# Patient Record
Sex: FEMALE | Race: WHITE
[De-identification: names, ages, dates, MRNs, and addresses within clinical notes are randomized per-mention and may not be internally consistent; named-entity substitution may affect disease eponyms.]

---

## 2017-08-22 ENCOUNTER — HOSPITAL ENCOUNTER (INPATIENT)
Dept: HOSPITAL 17 - NEPE | Age: 67
LOS: 4 days | Discharge: HOME HEALTH | DRG: 683 | End: 2017-08-26
Attending: HOSPITALIST | Admitting: HOSPITALIST
Payer: MEDICARE

## 2017-08-22 VITALS — OXYGEN SATURATION: 99 %

## 2017-08-22 VITALS
OXYGEN SATURATION: 100 % | DIASTOLIC BLOOD PRESSURE: 71 MMHG | SYSTOLIC BLOOD PRESSURE: 128 MMHG | HEART RATE: 92 BPM | RESPIRATION RATE: 14 BRPM

## 2017-08-22 VITALS
RESPIRATION RATE: 16 BRPM | DIASTOLIC BLOOD PRESSURE: 72 MMHG | HEART RATE: 90 BPM | SYSTOLIC BLOOD PRESSURE: 128 MMHG | OXYGEN SATURATION: 100 % | TEMPERATURE: 98.1 F

## 2017-08-22 VITALS
HEART RATE: 95 BPM | TEMPERATURE: 97 F | SYSTOLIC BLOOD PRESSURE: 110 MMHG | DIASTOLIC BLOOD PRESSURE: 65 MMHG | OXYGEN SATURATION: 100 % | RESPIRATION RATE: 18 BRPM

## 2017-08-22 VITALS
OXYGEN SATURATION: 99 % | TEMPERATURE: 98.5 F | RESPIRATION RATE: 15 BRPM | SYSTOLIC BLOOD PRESSURE: 118 MMHG | HEART RATE: 107 BPM | DIASTOLIC BLOOD PRESSURE: 57 MMHG

## 2017-08-22 VITALS — WEIGHT: 136.91 LBS | HEIGHT: 64 IN | BODY MASS INDEX: 23.37 KG/M2

## 2017-08-22 DIAGNOSIS — G25.81: ICD-10-CM

## 2017-08-22 DIAGNOSIS — N17.9: Primary | ICD-10-CM

## 2017-08-22 DIAGNOSIS — M81.0: ICD-10-CM

## 2017-08-22 DIAGNOSIS — I10: ICD-10-CM

## 2017-08-22 DIAGNOSIS — D63.8: ICD-10-CM

## 2017-08-22 DIAGNOSIS — E87.6: ICD-10-CM

## 2017-08-22 DIAGNOSIS — F41.9: ICD-10-CM

## 2017-08-22 DIAGNOSIS — E86.0: ICD-10-CM

## 2017-08-22 DIAGNOSIS — D50.9: ICD-10-CM

## 2017-08-22 DIAGNOSIS — K22.70: ICD-10-CM

## 2017-08-22 DIAGNOSIS — F32.9: ICD-10-CM

## 2017-08-22 DIAGNOSIS — E83.51: ICD-10-CM

## 2017-08-22 DIAGNOSIS — R62.7: ICD-10-CM

## 2017-08-22 DIAGNOSIS — E87.1: ICD-10-CM

## 2017-08-22 LAB
ALP SERPL-CCNC: 262 U/L (ref 45–117)
ALT SERPL-CCNC: 30 U/L (ref 10–53)
ANION GAP SERPL CALC-SCNC: 13 MEQ/L (ref 5–15)
AST SERPL-CCNC: 156 U/L (ref 15–37)
BACTERIA #/AREA URNS HPF: (no result) /HPF
BASOPHILS # BLD AUTO: 0.1 TH/MM3 (ref 0–0.2)
BASOPHILS NFR BLD: 1.2 % (ref 0–2)
BILIRUB SERPL-MCNC: 1.8 MG/DL (ref 0.2–1)
BUN SERPL-MCNC: 29 MG/DL (ref 7–18)
CHLORIDE SERPL-SCNC: 90 MEQ/L (ref 98–107)
COLOR UR: YELLOW
COMMENT (UR): (no result)
CULTURE IF INDICATED: (no result)
EOSINOPHIL # BLD: 0 TH/MM3 (ref 0–0.4)
EOSINOPHIL NFR BLD: 0.5 % (ref 0–4)
ERYTHROCYTE [DISTWIDTH] IN BLOOD BY AUTOMATED COUNT: 16.5 % (ref 11.6–17.2)
GFR SERPLBLD BASED ON 1.73 SQ M-ARVRAT: 18 ML/MIN (ref 89–?)
GLUCOSE UR STRIP-MCNC: (no result) MG/DL
HCO3 BLD-SCNC: 24.7 MEQ/L (ref 21–32)
HCT VFR BLD CALC: 24.2 % (ref 35–46)
HEMO FLAGS: (no result)
HGB UR QL STRIP: (no result)
INR PPP: 1.1 RATIO
KETONES UR STRIP-MCNC: (no result) MG/DL
LYMPHOCYTES # BLD AUTO: 0.4 TH/MM3 (ref 1–4.8)
LYMPHOCYTES NFR BLD AUTO: 6.6 % (ref 9–44)
MCH RBC QN AUTO: 38.1 PG (ref 27–34)
MCHC RBC AUTO-ENTMCNC: 35 % (ref 32–36)
MCV RBC AUTO: 108.9 FL (ref 80–100)
MONOCYTES NFR BLD: 14.1 % (ref 0–8)
NEUTROPHILS # BLD AUTO: 5 TH/MM3 (ref 1.8–7.7)
NEUTROPHILS NFR BLD AUTO: 77.6 % (ref 16–70)
NITRITE UR QL STRIP: (no result)
PLATELET # BLD: 260 TH/MM3 (ref 150–450)
POTASSIUM SERPL-SCNC: 3.9 MEQ/L (ref 3.5–5.1)
PROTHROMBIN TIME: 12.7 SEC (ref 9.8–11.6)
RBC # BLD AUTO: 2.23 MIL/MM3 (ref 4–5.3)
SODIUM SERPL-SCNC: 128 MEQ/L (ref 136–145)
SP GR UR STRIP: 1.01 (ref 1–1.03)
SQUAMOUS #/AREA URNS HPF: 1 /HPF (ref 0–5)
WBC # BLD AUTO: 6.5 TH/MM3 (ref 4–11)

## 2017-08-22 PROCEDURE — 83550 IRON BINDING TEST: CPT

## 2017-08-22 PROCEDURE — 82728 ASSAY OF FERRITIN: CPT

## 2017-08-22 PROCEDURE — 82248 BILIRUBIN DIRECT: CPT

## 2017-08-22 PROCEDURE — 76937 US GUIDE VASCULAR ACCESS: CPT

## 2017-08-22 PROCEDURE — 83690 ASSAY OF LIPASE: CPT

## 2017-08-22 PROCEDURE — 85610 PROTHROMBIN TIME: CPT

## 2017-08-22 PROCEDURE — 82272 OCCULT BLD FECES 1-3 TESTS: CPT

## 2017-08-22 PROCEDURE — 76700 US EXAM ABDOM COMPLETE: CPT

## 2017-08-22 PROCEDURE — 96360 HYDRATION IV INFUSION INIT: CPT

## 2017-08-22 PROCEDURE — 80074 ACUTE HEPATITIS PANEL: CPT

## 2017-08-22 PROCEDURE — 83540 ASSAY OF IRON: CPT

## 2017-08-22 PROCEDURE — 85007 BL SMEAR W/DIFF WBC COUNT: CPT

## 2017-08-22 PROCEDURE — 80048 BASIC METABOLIC PNL TOTAL CA: CPT

## 2017-08-22 PROCEDURE — 85025 COMPLETE CBC W/AUTO DIFF WBC: CPT

## 2017-08-22 PROCEDURE — 93306 TTE W/DOPPLER COMPLETE: CPT

## 2017-08-22 PROCEDURE — 80053 COMPREHEN METABOLIC PANEL: CPT

## 2017-08-22 PROCEDURE — 74176 CT ABD & PELVIS W/O CONTRAST: CPT

## 2017-08-22 PROCEDURE — 82607 VITAMIN B-12: CPT

## 2017-08-22 PROCEDURE — 81001 URINALYSIS AUTO W/SCOPE: CPT

## 2017-08-22 PROCEDURE — 84155 ASSAY OF PROTEIN SERUM: CPT

## 2017-08-22 PROCEDURE — 84443 ASSAY THYROID STIM HORMONE: CPT

## 2017-08-22 PROCEDURE — 85027 COMPLETE CBC AUTOMATED: CPT

## 2017-08-22 PROCEDURE — 93880 EXTRACRANIAL BILAT STUDY: CPT

## 2017-08-22 PROCEDURE — 80076 HEPATIC FUNCTION PANEL: CPT

## 2017-08-22 PROCEDURE — 82746 ASSAY OF FOLIC ACID SERUM: CPT

## 2017-08-22 RX ADMIN — Medication SCH ML: at 22:06

## 2017-08-22 NOTE — RADRPT
EXAM DATE/TIME:  08/22/2017 19:35 

 

[facility] COMPARISON:     

No previous studies available for comparison.

 

 

INDICATIONS :     

Abdominal pain and general weakness.

                  

 

ORAL CONTRAST:      

No oral contrast ingested.

                  

 

RADIATION DOSE:     

5.63 CTDIvol (mGy) 

 

 

MEDICAL HISTORY :     

None  

 

SURGICAL HISTORY :      

Hysterectomy. 

 

ENCOUNTER:      

Initial

 

ACUITY:      

1 day

 

PAIN SCALE:      

4/10

 

LOCATION:         

abdomen

 

TECHNIQUE:     

Volumetric scanning of the abdomen and pelvis was performed.  Using automated exposure control and ad
justment of the mA and/or kV according to patient size, radiation dose was kept as low as reasonably 
achievable to obtain optimal diagnostic quality images.  DICOM format image data is available electro
nically for review and comparison.  

 

FINDINGS:     

 

LOWER LUNGS:     

There is a 4 mm noncalcified pulmonary nodule in the right lower lobe and a 5 mm noncalcified pulmona
ry nodule in the left lower lobe.

 

LIVER:     

Severe diffuse low density without lesion.  There is no dilation of the biliary tree.  No calcified g
allstones.

 

SPLEEN:     

Normal size without lesion.

 

PANCREAS:     

Within normal limits. 

 

KIDNEYS:     

Normal in size and shape.  There is no mass, stone, or hydronephrosis.

 

ADRENAL GLANDS:     

Within normal limits.

 

VASCULAR:     

There is no aortic aneurysm. There is moderate atherosclerotic disease.

 

BOWEL/MESENTERY:     

The stomach, small bowel, and colon demonstrate no acute abnormality.  There is no free intraperitone
al air or fluid. There is sigmoid diverticulosis.

 

ABDOMINAL WALL:     

Within normal limits.

 

RETROPERITONEUM:     

There is no lymphadenopathy.

 

BLADDER:     

No wall thickening or mass.

 

REPRODUCTIVE:     

The uterus is absent. No adnexal abnormality is seen.

 

INGUINAL:     

There is no lymphadenopathy or hernia.

 

MUSCULOSKELETAL:     

There is a right hip arthroplasty hardware present causing beam hardening artifact and partial obscur
ation of the pelvic structures. Degenerative changes are present at L4-L5 and L5-S1.

 

CONCLUSION:     

1. No acute finding is identified to explain the clinical symptoms.

2. There is a 4 mm right lower lobe and 5 mm left lower lobe on pulmonary nodule. Suggest correlating
 with any prior imaging studies that could confirm longer-term stability. If none are available, sugg
est 6 month followup noncontrast chest CT.

3. Nonacute findings include severe hepatic steatosis, moderate atherosclerotic disease, and sigmoid 
diverticulosis.

 

 

 

 Dusty Forbes MD on August 22, 2017 at 20:19           

Board Certified Radiologist.

 This report was verified electronically.

## 2017-08-23 VITALS
DIASTOLIC BLOOD PRESSURE: 76 MMHG | OXYGEN SATURATION: 97 % | TEMPERATURE: 97.6 F | RESPIRATION RATE: 17 BRPM | SYSTOLIC BLOOD PRESSURE: 127 MMHG | HEART RATE: 76 BPM

## 2017-08-23 VITALS
HEART RATE: 80 BPM | OXYGEN SATURATION: 94 % | RESPIRATION RATE: 17 BRPM | DIASTOLIC BLOOD PRESSURE: 70 MMHG | SYSTOLIC BLOOD PRESSURE: 168 MMHG | TEMPERATURE: 96.6 F

## 2017-08-23 VITALS
SYSTOLIC BLOOD PRESSURE: 123 MMHG | HEART RATE: 104 BPM | OXYGEN SATURATION: 97 % | RESPIRATION RATE: 17 BRPM | TEMPERATURE: 96.6 F | DIASTOLIC BLOOD PRESSURE: 72 MMHG

## 2017-08-23 VITALS
TEMPERATURE: 98.2 F | RESPIRATION RATE: 17 BRPM | SYSTOLIC BLOOD PRESSURE: 138 MMHG | DIASTOLIC BLOOD PRESSURE: 65 MMHG | OXYGEN SATURATION: 99 % | HEART RATE: 108 BPM

## 2017-08-23 VITALS
DIASTOLIC BLOOD PRESSURE: 72 MMHG | OXYGEN SATURATION: 100 % | SYSTOLIC BLOOD PRESSURE: 122 MMHG | HEART RATE: 101 BPM | RESPIRATION RATE: 17 BRPM | TEMPERATURE: 95.9 F

## 2017-08-23 LAB
ALP SERPL-CCNC: 237 U/L (ref 45–117)
ALT SERPL-CCNC: 30 U/L (ref 10–53)
ANION GAP SERPL CALC-SCNC: 9 MEQ/L (ref 5–15)
AST SERPL-CCNC: 150 U/L (ref 15–37)
BASOPHILS # BLD AUTO: 0 TH/MM3 (ref 0–0.2)
BASOPHILS NFR BLD: 0.9 % (ref 0–2)
BILIRUB SERPL-MCNC: 1.3 MG/DL (ref 0.2–1)
BUN SERPL-MCNC: 31 MG/DL (ref 7–18)
CHLORIDE SERPL-SCNC: 97 MEQ/L (ref 98–107)
EOSINOPHIL # BLD: 0.1 TH/MM3 (ref 0–0.4)
EOSINOPHIL NFR BLD: 1.4 % (ref 0–4)
ERYTHROCYTE [DISTWIDTH] IN BLOOD BY AUTOMATED COUNT: 16.2 % (ref 11.6–17.2)
GFR SERPLBLD BASED ON 1.73 SQ M-ARVRAT: 18 ML/MIN (ref 89–?)
HCO3 BLD-SCNC: 25.7 MEQ/L (ref 21–32)
HCT VFR BLD CALC: 22.4 % (ref 35–46)
HEMO FLAGS: (no result)
LYMPHOCYTES # BLD AUTO: 0.7 TH/MM3 (ref 1–4.8)
LYMPHOCYTES NFR BLD AUTO: 12.7 % (ref 9–44)
MCH RBC QN AUTO: 36.8 PG (ref 27–34)
MCHC RBC AUTO-ENTMCNC: 33.4 % (ref 32–36)
MCV RBC AUTO: 110.1 FL (ref 80–100)
MONOCYTES NFR BLD: 15.3 % (ref 0–8)
NEUTROPHILS # BLD AUTO: 3.6 TH/MM3 (ref 1.8–7.7)
NEUTROPHILS NFR BLD AUTO: 69.7 % (ref 16–70)
PLATELET # BLD: 240 TH/MM3 (ref 150–450)
POTASSIUM SERPL-SCNC: 3.7 MEQ/L (ref 3.5–5.1)
RBC # BLD AUTO: 2.03 MIL/MM3 (ref 4–5.3)
SODIUM SERPL-SCNC: 132 MEQ/L (ref 136–145)
VIT B12 SERPL-MCNC: 1041 PG/ML (ref 193–986)
WBC # BLD AUTO: 5.2 TH/MM3 (ref 4–11)

## 2017-08-23 RX ADMIN — Medication SCH ML: at 22:49

## 2017-08-23 RX ADMIN — PHENYTOIN SODIUM SCH MLS/HR: 50 INJECTION INTRAMUSCULAR; INTRAVENOUS at 22:50

## 2017-08-23 RX ADMIN — Medication SCH ML: at 09:00

## 2017-08-23 RX ADMIN — DIAZEPAM PRN MG: 5 TABLET ORAL at 20:33

## 2017-08-23 RX ADMIN — PANTOPRAZOLE SCH MG: 40 TABLET, DELAYED RELEASE ORAL at 09:30

## 2017-08-23 RX ADMIN — PHENYTOIN SODIUM SCH MLS/HR: 50 INJECTION INTRAMUSCULAR; INTRAVENOUS at 13:55

## 2017-08-23 RX ADMIN — DIAZEPAM PRN MG: 5 TABLET ORAL at 09:31

## 2017-08-23 RX ADMIN — HEPARIN SODIUM SCH UNITS: 10000 INJECTION, SOLUTION INTRAVENOUS; SUBCUTANEOUS at 20:31

## 2017-08-23 RX ADMIN — PHENYTOIN SODIUM SCH MLS/HR: 50 INJECTION INTRAMUSCULAR; INTRAVENOUS at 02:57

## 2017-08-23 NOTE — HHI.HP
__________________________________________________





HPI


Service


[facility]


Primary Care Physician


Charlene Jimenez, DO


Admission Diagnosis





acute renal failure, failure to thrive, dehydration


Diagnoses:  


Travel History


International Travel<30 Days:  No


Contact w/Intl Traveler <30 Da:  No


Traveled to Known Affected Are:  No


History of Present Illness


when i got up, i could not stand up, dizzy, heart beating fast , 


no sweating 


no nausea at thsi time, but have been a few days time 


no room spinning 


no vomiting 


no syncope, no falls, not hitting head


but felt so weak, could not even walk to car, had to call 911





not this bad, but has had other similar eposides


went to pcp previously, and was told to go to er if it hapens again





but had nausea and diarrhea- but has had it on and off for a while- about 2 

weeks


diarrhea is more of one large amount, then a few small amounts


no black or red color 


no vomiting 


no fever 


had cough a few days - white color mucus, no more cough now


no abdominal pain or chest pain or shortness of breath


has hx of vertigo


no urine symptoms





egd - a month ago- found two little ulcers there, but was not actively bleeding

; but had beginning stages of barretts . Dr Morel


last clonoscopy about 5yrs ago, was told not due for one now





Past Family Social History


Past Medical History


htn


panic attacks


RLS


Past Surgical History


breast cancer left lumpectomy


just had one dose of chemo- but refused further


s/p radiation 10yrs ago 


had mammogram yearly





right hip orif


right hand sx 


hysterectomy


Allergies:  


Uncoded Allergies:  


     raw egg (Allergy, Unknown, 17)


Family History


parents- father- htn


mother- htn


Social History


never a smoker


drink etoh about 2 glasses of wine 


no drugs 


no longer driving due to vertigo





Physical Exam


Vital Signs





Vital Signs








  Date Time  Temp Pulse Resp B/P (MAP) Pulse Ox O2 Delivery O2 Flow Rate FiO2


 


17 21:12      Room Air  


 


17 20:13 98.1 90 16 128/72 (90) 100 Room Air  


 


17 19:08  92 14 128/71 (90) 100 Room Air  


 


17 17:23     99 Room Air  


 


17 16:13 98.5 107 15 118/57 (77) 99 Room Air  








Physical Exam


GENERAL: This is a well-nourished, well-developed patient, in no apparent 

distress.


SKIN: No rashes, ecchymoses or lesions. Cool and dry.


HEAD: Atraumatic. Normocephalic. No temporal or scalp tenderness.


EYES: Pupils equal round and reactive. Extraocular motions intact. No scleral 

icterus. No injection or drainage. 


ENT: Nose without bleeding, purulent drainage or septal hematoma. Throat 

without erythema, tonsillar hypertrophy or exudate. Uvula midline. Airway 

patent.


NECK: Trachea midline. No JVD or lymphadenopathy. Supple, nontender, no 

meningeal signs.


CARDIOVASCULAR: Regular rate and rhythm without murmurs, gallops, or rubs. 


RESPIRATORY: Clear to auscultation. Breath sounds equal bilaterally. No wheezes

, rales, or rhonchi.  


GASTROINTESTINAL: Abdomen soft, non-tender, nondistended. No hepato-splenomegaly

, or palpable masses. No guarding.


MUSCULOSKELETAL: Extremities without clubbing, cyanosis, or edema. No joint 

tenderness, effusion, or edema noted. No calf tenderness. Negative Homans sign 

bilaterally.


NEUROLOGICAL: Awake and alert. Cranial nerves II through XII intact.  Motor and 

sensory grossly within normal limits. Five out of 5 muscle strength in all 

muscle groups.  Normal speech.


Laboratory





Laboratory Tests








Test


  17


17:00 17


23:12


 


White Blood Count 6.5  


 


Red Blood Count 2.23  


 


Hemoglobin 8.5  


 


Hematocrit 24.2  


 


Mean Corpuscular Volume 108.9  


 


Mean Corpuscular Hemoglobin 38.1  


 


Mean Corpuscular Hemoglobin


Concent 35.0 


  


 


 


Red Cell Distribution Width 16.5  


 


Platelet Count 260  


 


Mean Platelet Volume 9.0  


 


Neutrophils (%) (Auto) 77.6  


 


Lymphocytes (%) (Auto) 6.6  


 


Monocytes (%) (Auto) 14.1  


 


Eosinophils (%) (Auto) 0.5  


 


Basophils (%) (Auto) 1.2  


 


Neutrophils # (Auto) 5.0  


 


Lymphocytes # (Auto) 0.4  


 


Monocytes # (Auto) 0.9  


 


Eosinophils # (Auto) 0.0  


 


Basophils # (Auto) 0.1  


 


CBC Comment DIFF FINAL  


 


Differential Comment   


 


Prothrombin Time 12.7  


 


Prothromb Time International


Ratio 1.1 


  


 


 


Blood Urea Nitrogen 29  


 


Creatinine 2.64  


 


Random Glucose 89  


 


Total Protein 5.9  


 


Albumin 2.7  


 


Calcium Level 7.9  


 


Alkaline Phosphatase 262  


 


Aspartate Amino Transf


(AST/SGOT) 156 


  


 


 


Alanine Aminotransferase


(ALT/SGPT) 30 


  


 


 


Total Bilirubin 1.8  


 


Direct Bilirubin 0.8  


 


Sodium Level 128  


 


Potassium Level 3.9  


 


Chloride Level 90  


 


Carbon Dioxide Level 24.7  


 


Anion Gap 13  


 


Estimat Glomerular Filtration


Rate 18 


  


 


 


Lipase 124  


 


Urine Color  YELLOW 


 


Urine Turbidity  CLEAR 


 


Urine pH  5.5 


 


Urine Specific Gravity  1.006 


 


Urine Protein  NEG 


 


Urine Glucose (UA)  NEG 


 


Urine Ketones  NEG 


 


Urine Occult Blood  NEG 


 


Urine Nitrite  NEG 


 


Urine Bilirubin  NEG 


 


Urine Urobilinogen  LESS THAN 2.0 


 


Urine Leukocyte Esterase  NEG 


 


Urine RBC  LESS THAN 1 


 


Urine WBC  2 


 


Urine Squamous Epithelial


Cells 


  1 


 


 


Urine Amorphous Sediment  RARE 


 


Urine Bacteria  RARE 


 


Microscopic Urinalysis Comment


  


  CULT NOT


INDICATED








Result Diagram:  


17








Caprini VTE Risk Assessment


Caprini Risk Assessment Model











 Point Value = 1          Point Value = 2  Point Value = 3  Point Value = 5


 


Age 41-60


Minor surgery


BMI > 25 kg/m2


Swollen legs


Varicose veins


Pregnancy or postpartum


History of unexplained or recurrent


   spontaneous 


Oral contraceptives or hormone


   replacement


Sepsis (< 1 month)


Serious lung disease, including


   pneumonia (< 1 month)


Abnormal pulmonary function


Acute myocardial infarction


Congestive heart failure (< 1 month)


History of inflammatory bowel disease


Medical patient at bed rest Age 61-74


Arthroscopic surgery


Major open surgery (> 45 min)


Laparoscopic surgery (> 45 min)


Malignancy


Confined to bed (> 72 hours)


Immobilizing plaster cast


Central venous access Age >= 75


History of VTE


Family history of VTE


Factor V Leiden


Prothrombin [id number]


Lupus anticoagulant


Anticardiolipin antibodies


Elevated serum homocysteine


Heparin-induced thrombocytopenia


Other congenital or acquired


   thrombophilia Stroke (< 1 month)


Elective arthroplasty


Hip, pelvis, or leg fracture


Acute spinal cord injury (< 1 month)








Prophylaxis Regimen











   Total Risk


Factor Score Risk Level Prophylaxis Regimen


 


0-1      Low Early ambulation


 


2 Moderate Order ONE of the following:


*Sequential Compression Device (SCD)


*Heparin 5000 units SQ BID


 


3-4 Higher Order ONE of the following medications:


*Heparin 5000 units SQ TID


*Enoxaparin/Lovenox 40 mg SQ daily (WT < 150 kg, CrCl > 30 mL/min)


*Enoxaparin/Lovenox 30 mg SQ daily (WT < 150 kg, CrCl > 10-29 mL/min)


*Enoxaparin/Lovenox 30 mg SQ BID (WT < 150 kg, CrCl > 30 mL/min)


AND/OR


*Sequential Compression Device (SCD)


 


5 or more Highest Order ONE of the following medications:


*Heparin 5000 units SQ TID (Preferred with Epidurals)


*Enoxaparin/Lovenox 40 mg SQ daily (WT < 150 kg, CrCl > 30 mL/min)


*Enoxaparin/Lovenox 30 mg SQ daily (WT < 150 kg, CrCl > 10-29 mL/min)


*Enoxaparin/Lovenox 30 mg SQ BID (WT < 150 kg, CrCl > 30 mL/min)


AND


*Sequential Compression Device (SCD)











Assessment and Plan


Assessment and Plan


near syncope- in setting of chronic vertigo. Question whether orthostatics as 

well


acute renal failure


acute liver failure - underlying cirrhosis vs acute hepatitis 


anemia- acute on chronic - recent egd with early barretts 


macrocytosis- etoh induced? - check tsh, b12, folate


hyponatremia- likely poor oral intake








iv hydration 


repeat labs in am 


tsh, folate, hepatitis profile


echo, carotid


abdominal US -to evaluate for liver and kidney





Physician Certification


Order for Inpatient Services


The services are ordered in accordance with Medicare regulations or non-

Medicare payer requirements, as applicable.  In the case of services not 

specified as inpatient-only, they are appropriately provided as inpatient 

services in accordance with the 2-midnight benchmark.


 days is the estimated time the patient will need to remain in the hospital, 

assuming treatment plan goals are met and no additional complications.











Jada Dowling MD Aug 23, 2017 01:55

## 2017-08-23 NOTE — HHI.PR
Subjective


Remarks


Follow-up for dizziness/lightheadedness





No further episodes of presyncope or lightheadedness.  Has been walking to the 

bathroom.  He feels a lot better.  Denies any chest pain, no urinary symptoms.  

History about diarrhea, allegedly not loose but not her normal bowel movements.





Objective


Vitals





Vital Signs








  Date Time  Temp Pulse Resp B/P (MAP) Pulse Ox O2 Delivery O2 Flow Rate FiO2


 


8/23/17 12:00 95.9 101 17 122/72 (89) 100   





    134/81 (98)    





    125/67 (86)    


 


8/23/17 08:00 97.6 76 17 127/76 (93) 97   


 


8/23/17 05:05 98.2 108 17 138/65 (89) 99   


 


8/22/17 21:32 97.0 95 18 110/65 (80) 100   


 


8/22/17 21:12      Room Air  


 


8/22/17 20:13 98.1 90 16 128/72 (90) 100 Room Air  


 


8/22/17 19:08  92 14 128/71 (90) 100 Room Air  


 


8/22/17 17:23     99 Room Air  


 


8/22/17 16:13 98.5 107 15 118/57 (77) 99 Room Air  














I/O      


 


 8/22/17 8/22/17 8/22/17 8/23/17 8/23/17 8/23/17





 07:00 15:00 23:00 07:00 15:00 23:00


 


Intake Total   1120 ml 680 ml  


 


Output Total    1 ml  


 


Balance   1120 ml 679 ml  


 


      


 


Intake Oral   120 ml 480 ml  


 


IV Total   1000 ml 200 ml  


 


Output Urine Total    1 ml  


 


# Voids   1   


 


# Bowel Movements   0 0  








Result Diagram:  


8/23/17 0715                                                                   

             8/23/17 0715





Objective Remarks


Not in distress


PERRL,  pink conjunctiva without injection, anicteric


Nose without bleeding, airway patent, oropharynx clear


Supple neck, no masses or thyromegaly, trachea midline


Regular rate and rhythm, no murmurs


Clear to auscultation and symmetric bilaterally, normal respiratory effort.  


Normal bowel sounds, soft, non-tender, nondistended, no guarding. 


Extremities without clubbing, cyanosis, or edema. 


No rash of generalized distribution.  Skin is warm and dry.


AAO x3, no cranial nerve deficits, moves all 4 extremities, no focal neurologic 

deficits





A/P


Problem List:  


(1) Generalized weakness


ICD Code:  R53.1 - Weakness


Status:  Acute


(2) Acute renal failure


ICD Code:  N17.9 - Acute kidney failure, unspecified


Status:  Acute


(3) Dehydration


ICD Code:  E86.0 - Dehydration


Status:  Acute


Assessment and Plan


This is a 67-year-old female admitted for presyncope





Presyncope-in setting of chronic vertigo, orthostatics negative.  Likely 

secondary to hypovolemia, increase IVF.   Carotid ultrasound showed moderate 

stenosis 50-69% on the right, minimal stenosis of the left.  Follow-up 

echocardiogram.





Acute renal failure-increase normal saline 225 cc/h, check BMP tomorrow.  No 

hydronephrosis on CT scan of the abdomen.





Anemia-?  Etiology, check iron panel, previous history of Johnson's esophagus.  

Patient has macrocytosis, vitamin B 12 and folic acid good.  TSH within normal 

limits.





Mild AST ALT elevation- ultrasound of the liver showed hepato-steatosis, doubt 

cirrhosis, check hepatitis serology.





Hyponatremia-likely from hypovolemia, recheck BMP tomorrow, continue normal 

saline.





PT evaluation





Heparin for DVT prophylaxis





Discussed with wife and .


Discharge Planning


Possible discharge tomorrow





Problem Qualifiers





(1) Acute renal failure:  


Qualified Codes:  N17.9 - Acute kidney failure, unspecified








Chaparrita Griffin MD Aug 23, 2017 13:53

## 2017-08-23 NOTE — RADRPT
EXAM DATE/TIME:  08/23/2017 08:03 

 

[facility] COMPARISON:     

CT ABDOMEN & PELVIS W/O CONTRAST, August 22, 2017, 19:35.

        

 

 

INDICATIONS :     

Liver and kidney failure.

                     

 

MEDICAL HISTORY :           

Liver and kidney failure.

 

SURGICAL HISTORY :          

Tonsillectomy.  Right thumb surgery.  Hysterectomy.  Left breast lumpectomy.

 

ENCOUNTER:     

Initial

 

ACUITY:     

1 day

 

PAIN SCORE:     

0/10

 

LOCATION:     

Bilateral upper quadrant 

                     

MEASUREMENTS:     

 

LIVER:     

17.5 cm length 

 

COMMON DUCT:     

5 mm

 

RIGHT KIDNEY:     

10.9 x 4.7 x 5.2 cm

 

LEFT KIDNEY:     

10.8 x 4.5 x 5.6 cm

 

SPLEEN:     

9.4 cm length

 

AORTA:     

2.0cm maximal      

 

FINDINGS:     

 

LIVER:     

The liver is diffusely echogenic. No mass or ductal dilatation. Hepatopedal flow within the portal ve
in.

 

COMMON DUCT:     

No intraluminal mass or stone visualized.  

 

GALLBLADDER:     

Contains no stones, demonstrates no wall thickening or pericholecystic fluid.  

 

PANCREAS:     

The visualized portions are within normal limits.  

 

RIGHT KIDNEY:     

No hydronephrosis, stone or mass.  

 

LEFT KIDNEY:     

No hydronephrosis, stone or mass.  

 

SPLEEN:     

No focal lesion.  

 

AORTA:     

Non aneurysmal.  

 

IVC:     

Within normal limits.  

 

CONCLUSION:     

1. Hepatic steatosis.

 

 

 

 Francisco Waddell Jr., MD on August 23, 2017 at 10:23           

Board Certified Radiologist.

 This report was verified electronically.

## 2017-08-23 NOTE — RADRPT
EXAM DATE/TIME:  08/23/2017 08:03 

 

[facility] COMPARISON:     

No previous studies available for comparison.

        

 

 

INDICATIONS :     

General weakness, near syncope.

                     

 

MEDICAL HISTORY :           

General weakness, near syncope.

 

SURGICAL HISTORY :          

Tonsillectomy.  Right thumb surgery.  Hysterectomy.  Left breast lumpectomy.

 

ENCOUNTER:     

Initial

 

ACUITY:     

1 week

 

PAIN SCORE:     

0/10

 

LOCATION:     

Bilateral neck 

                     

PEAK SYSTOLIC VELOCITIES (cm/sec):

 

ICA/CCA RATIO:                    

Right: 1.7     Left: 0.98

 

ICA:                          

Right: 130     Left: 77

 

CCA:                          

Right: 75     Left: 78

 

ECA:                           

Right: 54     Left: 55

 

VERTEBRAL:           

Right: 45 antegrade     Left: 58 antegrade

             

Elevated flow velocities and ICA/CCA ratios have been found to correlate with increased degrees of

vessel stenosis, calculated as percentage of diameter relative to a normal segment of distal ICA/CCA

 

FINDINGS:     

 

RIGHT CAROTID:     

Mild plaque with increased ICA velocities corresponding to moderate stenosis.

 

LEFT CAROTID:     

Mild plaque. No significant stenosis is visualized.  The waveforms are within normal limits.

 

VERTEBRAL ARTERIES:  

Antegrade flow is seen in both vertebral arteries.

MISCELLANEOUS:     None.

 

CONCLUSION:     

1. Mild right carotid plaque with velocities corresponding to moderate, 50-69%, stenosis. Suspect champ
t this is artifactually elevated with overall imaging features most consistent with mild, less than 5
0%, stenosis.

2. No hemodynamically significant left carotid stenosis.

3. Antegrade vertebral artery flow bilaterally. 

 

 

 Hayder Driver MD on August 23, 2017 at 9:43           

Board Certified Radiologist.

 This report was verified electronically.

## 2017-08-24 VITALS
DIASTOLIC BLOOD PRESSURE: 82 MMHG | TEMPERATURE: 95.8 F | SYSTOLIC BLOOD PRESSURE: 148 MMHG | RESPIRATION RATE: 16 BRPM | OXYGEN SATURATION: 100 % | HEART RATE: 98 BPM

## 2017-08-24 VITALS
SYSTOLIC BLOOD PRESSURE: 151 MMHG | RESPIRATION RATE: 18 BRPM | TEMPERATURE: 98.6 F | HEART RATE: 116 BPM | OXYGEN SATURATION: 100 % | DIASTOLIC BLOOD PRESSURE: 91 MMHG

## 2017-08-24 VITALS
OXYGEN SATURATION: 98 % | DIASTOLIC BLOOD PRESSURE: 88 MMHG | TEMPERATURE: 97.5 F | SYSTOLIC BLOOD PRESSURE: 151 MMHG | HEART RATE: 98 BPM | RESPIRATION RATE: 17 BRPM

## 2017-08-24 VITALS
RESPIRATION RATE: 16 BRPM | OXYGEN SATURATION: 97 % | SYSTOLIC BLOOD PRESSURE: 130 MMHG | DIASTOLIC BLOOD PRESSURE: 84 MMHG | HEART RATE: 100 BPM | TEMPERATURE: 97.1 F

## 2017-08-24 VITALS
SYSTOLIC BLOOD PRESSURE: 139 MMHG | RESPIRATION RATE: 16 BRPM | TEMPERATURE: 95.6 F | HEART RATE: 114 BPM | DIASTOLIC BLOOD PRESSURE: 75 MMHG | OXYGEN SATURATION: 99 %

## 2017-08-24 VITALS
SYSTOLIC BLOOD PRESSURE: 115 MMHG | RESPIRATION RATE: 17 BRPM | HEART RATE: 115 BPM | DIASTOLIC BLOOD PRESSURE: 71 MMHG | OXYGEN SATURATION: 100 % | TEMPERATURE: 98.2 F

## 2017-08-24 LAB
ANION GAP SERPL CALC-SCNC: 10 MEQ/L (ref 5–15)
BASOPHILS # BLD AUTO: 0.1 TH/MM3 (ref 0–0.2)
BASOPHILS NFR BLD: 1 % (ref 0–2)
BUN SERPL-MCNC: 29 MG/DL (ref 7–18)
CALCIUM TP COR SERPL-MCNC: 7.8 MG/DL (ref 8.5–10.1)
CHLORIDE SERPL-SCNC: 104 MEQ/L (ref 98–107)
EOSINOPHIL # BLD: 0.1 TH/MM3 (ref 0–0.4)
EOSINOPHIL NFR BLD: 1.2 % (ref 0–4)
ERYTHROCYTE [DISTWIDTH] IN BLOOD BY AUTOMATED COUNT: 16.2 % (ref 11.6–17.2)
FERRITIN SERPL-MCNC: 563 NG/ML (ref 8–252)
GFR SERPLBLD BASED ON 1.73 SQ M-ARVRAT: 21 ML/MIN (ref 89–?)
HCO3 BLD-SCNC: 20.7 MEQ/L (ref 21–32)
HCT VFR BLD CALC: 21.3 % (ref 35–46)
HEMO FLAGS: (no result)
LYMPHOCYTES # BLD AUTO: 0.9 TH/MM3 (ref 1–4.8)
LYMPHOCYTES NFR BLD AUTO: 18.2 % (ref 9–44)
MCH RBC QN AUTO: 38.3 PG (ref 27–34)
MCHC RBC AUTO-ENTMCNC: 34.7 % (ref 32–36)
MCV RBC AUTO: 110.4 FL (ref 80–100)
MONOCYTES NFR BLD: 13.7 % (ref 0–8)
NEUTROPHILS # BLD AUTO: 3.4 TH/MM3 (ref 1.8–7.7)
NEUTROPHILS NFR BLD AUTO: 65.9 % (ref 16–70)
PLATELET # BLD: 227 TH/MM3 (ref 150–450)
POTASSIUM SERPL-SCNC: 3.3 MEQ/L (ref 3.5–5.1)
RBC # BLD AUTO: 1.93 MIL/MM3 (ref 4–5.3)
SODIUM SERPL-SCNC: 135 MEQ/L (ref 136–145)
TRANSFERRIN IRON PROFILE: 110 MG/DL (ref 200–360)
WBC # BLD AUTO: 5.2 TH/MM3 (ref 4–11)

## 2017-08-24 RX ADMIN — HEPARIN SODIUM SCH UNITS: 10000 INJECTION, SOLUTION INTRAVENOUS; SUBCUTANEOUS at 07:32

## 2017-08-24 RX ADMIN — DIAZEPAM PRN MG: 5 TABLET ORAL at 16:58

## 2017-08-24 RX ADMIN — PHENYTOIN SODIUM SCH MLS/HR: 50 INJECTION INTRAMUSCULAR; INTRAVENOUS at 06:00

## 2017-08-24 RX ADMIN — Medication SCH ML: at 07:32

## 2017-08-24 RX ADMIN — PANTOPRAZOLE SCH MG: 40 TABLET, DELAYED RELEASE ORAL at 07:32

## 2017-08-24 RX ADMIN — HEPARIN SODIUM SCH UNITS: 10000 INJECTION, SOLUTION INTRAVENOUS; SUBCUTANEOUS at 22:54

## 2017-08-24 RX ADMIN — PHENYTOIN SODIUM SCH MLS/HR: 50 INJECTION INTRAMUSCULAR; INTRAVENOUS at 16:58

## 2017-08-24 RX ADMIN — PHENYTOIN SODIUM SCH MLS/HR: 50 INJECTION INTRAMUSCULAR; INTRAVENOUS at 11:23

## 2017-08-24 RX ADMIN — Medication SCH ML: at 22:54

## 2017-08-24 RX ADMIN — SODIUM CHLORIDE SCH MLS/HR: 900 INJECTION, SOLUTION INTRAVENOUS at 15:49

## 2017-08-24 RX ADMIN — PHENYTOIN SODIUM SCH MLS/HR: 50 INJECTION INTRAMUSCULAR; INTRAVENOUS at 22:54

## 2017-08-24 NOTE — HHI.PR
Subjective


Remarks


Follow-up for dizziness





Dizziness better, no lightheadedness.  Denies any chest pain or shortness of 

breath.  Good urine output.





Objective


Vitals





Vital Signs








  Date Time  Temp Pulse Resp B/P (MAP) Pulse Ox O2 Delivery O2 Flow Rate FiO2


 


8/24/17 11:23 95.8 98 16 148/77 (100) 100   





    147/82 (103)    


 


8/24/17 08:00 97.5 98 17 151/88 (109) 98   


 


8/24/17 04:55 97.1 100 16 130/84 (99) 97   





        


 


8/24/17 00:30 98.2 115 17 115/63 (80) 100   





    119/73 (88)    





    119/71 (87)    


 


8/23/17 20:20 96.6 104 17 123/72 (89) 97   


 


8/23/17 16:00 96.6 80 17 168/70 (102) 94   














I/O      


 


 8/23/17 8/23/17 8/23/17 8/24/17 8/24/17 8/24/17





 06:59 14:59 22:59 06:59 14:59 22:59


 


Intake Total 680 ml 400 ml 1240 ml 240 ml  


 


Output Total 1 ml     


 


Balance 679 ml 400 ml 1240 ml 240 ml  


 


      


 


Intake Oral 480 ml 400 ml 240 ml 240 ml  


 


IV Total 200 ml  1000 ml   


 


Output Urine Total 1 ml     


 


# Voids  0 2 4  


 


# Bowel Movements 0 0 0 0  








Result Diagram:  


8/24/17 0555                                                                   

             8/24/17 0555





Objective Remarks


Not in distress


PERRL,  pink conjunctiva without injection, anicteric


Regular rate and rhythm, no murmurs


Clear to auscultation and symmetric bilaterally, normal respiratory effort.  


Normal bowel sounds, soft, non-tender, nondistended, no guarding. 


No edema


AAO x3, no cranial nerve deficits, moves all 4 extremities, no focal neurologic 

deficits





A/P


Problem List:  


(1) Generalized weakness


ICD Code:  R53.1 - Weakness


Status:  Acute


(2) Acute renal failure


ICD Code:  N17.9 - Acute kidney failure, unspecified


Status:  Acute


(3) Dehydration


ICD Code:  E86.0 - Dehydration


Status:  Acute


Assessment and Plan


This is a 67-year-old female admitted for presyncope





Presyncope-in setting of chronic vertigo, orthostatics negative.  Likely 

secondary to hypovolemia, increase IVF.   Carotid ultrasound showed moderate 

stenosis 50-69% on the right, minimal stenosis of the left. Echocardiogram 

showed EF 55-60%. 





Acute renal failure- Creatinine better, but not back to baseline, Cont IVF, 

increased to 1 75 cc/h.  No hydronephrosis on CT scan of the abdomen.  Recheck 

BMP tomorrow





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 

  Patient has macrocytosis, vitamin B 12 and folic acid good.  TSH within 

normal limits.  Check fecal occult blood.  Recheck CBC tomorrow





Mild AST ALT elevation- ultrasound of the liver showed hepato-steatosis, doubt 

cirrhosis, check hepatitis serology pending.  Recheck LFTs





Hyponatremia-likely from hypovolemia, better, continue IVF 





Hypocalcemia-replaced





Hypokalemia-replaced











Heparin for DVT prophylaxis


Discharge Planning


Discharged with home health physical therapy once creatinine is trending down.





Problem Qualifiers





(1) Acute renal failure:  


Qualified Codes:  N17.9 - Acute kidney failure, unspecified








Chaparrita Griffin MD Aug 24, 2017 13:06

## 2017-08-24 NOTE — ECHRPT
Indication:   SYNCOPE

 

 CONCLUSIONS

 Normal left ventricular size. 

 Wall thickness is normal. 

 The left ventricular systolic function is normal with an estimated ejection fraction in the range of
 55-60%. 

 Mitral annular calcification is present. 

 Mild-to-moderate mitral valve regurgitation. 

 There is moderate tricuspid regurgitation. 

 There is estimated mild pulmonary hypertension present (42 mmHg). 

 

 BP:  115   / 63      HR: 115                      Rhythm:           Sinus

 

 MEASUREMENTS  (Male / Female) Normal Values       Technical Quality:Good

 2D ECHO

 LV Diastolic Diameter PLAX        3.9 cm                4.2 - 5.9 / 3.9 - 5.3 cm

 LV Systolic Diameter PLAX         2.8 cm                

 IVS Diastolic Thickness           0.7 cm                0.6 - 1.0 / 0.6 - 0.9 cm

 LVPW Diastolic Thickness          0.6 cm                0.6 - 1.0 / 0.6 - 0.9 cm

 LV Relative Wall Thickness        0.3                   

 LA Systolic Diameter LX           3.3 cm                3.0 - 4.0 / 2.7 - 3.8 cm

 

 M-MODE

 Aortic Root Diameter MM           3.3 cm                

 AV Cusp Separation MM             1.9 cm                

 

 DOPPLER

 Mitral E Point Velocity           74.0 cm/s             

 Mitral A Point Velocity           52.8 cm/s             

 Mitral E to A Ratio               1.4                   

 TR Peak Velocity                  284.0 cm/s            

 TR Peak Gradient                  32.3 mmHg             

 

 

 FINDINGS

 

 LEFT VENTRICLE

 Normal left ventricular size. 

 Wall thickness is normal. 

 The left ventricular systolic function is normal with an estimated ejection fraction in the range of
 55-60%. 

 

 RIGHT VENTRICLE

 Normal right ventricular size and systolic function.  

 

 LEFT ATRIUM

 The left atrial size is normal.  

 

 RIGHT ATRIUM

 The right atrial size is normal.  

 

 ATRIAL SEPTUM

 Normal atrial septal thickness without atrial level shunting by limited color doppler interrogation.
  

 

 AORTA

 The aortic root and proximal ascending aorta are normal in size on limited imaging.  

 

 MITRAL VALVE

 Mitral annular calcification is present. 

 Mild-to-moderate mitral valve regurgitation. 

 

 AORTIC VALVE

 Trileaflet aortic valve. No aortic valve stenosis or regurgitation.  

 

 TRICUSPID VALVE

 There is moderate tricuspid regurgitation. 

 There is estimated mild pulmonary hypertension present (42 mmHg). 

 

 PULMONARY VALVE

 The pulmonary valve is not well visualized.  

 

 VESSELS

 The inferior vena cava is normal in size.  

 

 PERICARDIUM

 No pericardial effusion.  

 

 

 

 

  Tommy Yancey MD, FACC

  (Electronically Signed)

  Final Date:24 August 2017 12:39

## 2017-08-24 NOTE — HHI.FF
Face to Face Verification


Diagnosis:  


(1) Acute renal failure


(2) Generalized weakness


Physical Therapy


Order:  Evaluate and Treat





Home Health Nursing








Order: Medical education





 Signs/symptoms of disease process





 Nursing assessment with vital signs

















I have seen patient Polly Kaur on 8/24/17. My clinical findings support the 

need for the requested home health care services because:








 Limited ability to care for self














I certify that my clinical findings support that this patient is homebound 

because:








 Unsteady gait/balance

















Chaparrita Griffin MD Aug 24, 2017 15:31

## 2017-08-25 VITALS
TEMPERATURE: 98.4 F | SYSTOLIC BLOOD PRESSURE: 188 MMHG | OXYGEN SATURATION: 99 % | RESPIRATION RATE: 16 BRPM | HEART RATE: 100 BPM | DIASTOLIC BLOOD PRESSURE: 89 MMHG

## 2017-08-25 VITALS
RESPIRATION RATE: 18 BRPM | DIASTOLIC BLOOD PRESSURE: 78 MMHG | HEART RATE: 93 BPM | OXYGEN SATURATION: 99 % | SYSTOLIC BLOOD PRESSURE: 136 MMHG | TEMPERATURE: 96 F

## 2017-08-25 VITALS
HEART RATE: 100 BPM | TEMPERATURE: 97.1 F | RESPIRATION RATE: 20 BRPM | SYSTOLIC BLOOD PRESSURE: 165 MMHG | OXYGEN SATURATION: 99 % | DIASTOLIC BLOOD PRESSURE: 94 MMHG

## 2017-08-25 VITALS
SYSTOLIC BLOOD PRESSURE: 196 MMHG | OXYGEN SATURATION: 100 % | HEART RATE: 98 BPM | TEMPERATURE: 96.5 F | DIASTOLIC BLOOD PRESSURE: 88 MMHG | RESPIRATION RATE: 17 BRPM

## 2017-08-25 VITALS
HEART RATE: 96 BPM | RESPIRATION RATE: 17 BRPM | SYSTOLIC BLOOD PRESSURE: 167 MMHG | TEMPERATURE: 96.2 F | DIASTOLIC BLOOD PRESSURE: 87 MMHG | OXYGEN SATURATION: 99 %

## 2017-08-25 LAB
ALP SERPL-CCNC: 221 U/L (ref 45–117)
ALT SERPL-CCNC: 29 U/L (ref 10–53)
ANION GAP SERPL CALC-SCNC: 9 MEQ/L (ref 5–15)
AST SERPL-CCNC: 127 U/L (ref 15–37)
BASOPHILS # BLD AUTO: 0.1 TH/MM3 (ref 0–0.2)
BASOPHILS NFR BLD AUTO: 1 % (ref 0–2)
BASOPHILS NFR BLD: 1 % (ref 0–2)
BILIRUB INDIRECT SERPL-MCNC: 0.2 MG/DL (ref 0–0.8)
BILIRUB SERPL-MCNC: 0.8 MG/DL (ref 0.2–1)
BUN SERPL-MCNC: 21 MG/DL (ref 7–18)
CALCIUM TP COR SERPL-MCNC: 8.2 MG/DL (ref 8.5–10.1)
CHLORIDE SERPL-SCNC: 111 MEQ/L (ref 98–107)
EOSINOPHIL # BLD: 0.1 TH/MM3 (ref 0–0.4)
EOSINOPHIL NFR BLD: 2 % (ref 0–4)
EOSINOPHIL NFR BLD: 2 % (ref 0–4)
ERYTHROCYTE [DISTWIDTH] IN BLOOD BY AUTOMATED COUNT: 16.1 % (ref 11.6–17.2)
GFR SERPLBLD BASED ON 1.73 SQ M-ARVRAT: 31 ML/MIN (ref 89–?)
HCO3 BLD-SCNC: 20.3 MEQ/L (ref 21–32)
HCT VFR BLD CALC: 23.3 % (ref 35–46)
HEMO FLAGS: (no result)
LYMPHOCYTES # BLD AUTO: 0.8 TH/MM3 (ref 1–4.8)
LYMPHOCYTES NFR BLD AUTO: 16 % (ref 9–44)
MCH RBC QN AUTO: 36.7 PG (ref 27–34)
MCHC RBC AUTO-ENTMCNC: 32.9 % (ref 32–36)
MCV RBC AUTO: 111.5 FL (ref 80–100)
METAMYELOCYTES NFR BLD: 2 % (ref 0–1)
MONOCYTES NFR BLD: 14.3 % (ref 0–8)
MYELOCYTES NFR BLD: 1 % (ref 0–0)
NEUTROPHILS # BLD AUTO: 3.5 TH/MM3 (ref 1.8–7.7)
NEUTROPHILS NFR BLD AUTO: 66.7 % (ref 16–70)
NEUTS BAND # BLD MANUAL: 3.9 TH/MM3 (ref 1.8–7.7)
NEUTS BAND NFR BLD: 2 % (ref 0–6)
NEUTS SEG NFR BLD MANUAL: 66 % (ref 16–70)
PLAT MORPH BLD: NORMAL
PLATELET # BLD: 204 TH/MM3 (ref 150–450)
PLATELET BLD QL SMEAR: NORMAL
POTASSIUM SERPL-SCNC: 3.6 MEQ/L (ref 3.5–5.1)
PROMYELOCYTES NFR BLD: 3 % (ref 0–0)
RBC # BLD AUTO: 2.09 MIL/MM3 (ref 4–5.3)
SCAN/DIFF: (no result)
SODIUM SERPL-SCNC: 140 MEQ/L (ref 136–145)
WBC # BLD AUTO: 5.3 TH/MM3 (ref 4–11)
WBC DIFF SAMPLE: 100

## 2017-08-25 RX ADMIN — HEPARIN SODIUM SCH UNITS: 10000 INJECTION, SOLUTION INTRAVENOUS; SUBCUTANEOUS at 22:20

## 2017-08-25 RX ADMIN — Medication SCH ML: at 22:20

## 2017-08-25 RX ADMIN — Medication SCH ML: at 08:33

## 2017-08-25 RX ADMIN — DIAZEPAM PRN MG: 5 TABLET ORAL at 12:08

## 2017-08-25 RX ADMIN — SODIUM CHLORIDE SCH MLS/HR: 900 INJECTION, SOLUTION INTRAVENOUS at 10:25

## 2017-08-25 RX ADMIN — HEPARIN SODIUM SCH UNITS: 10000 INJECTION, SOLUTION INTRAVENOUS; SUBCUTANEOUS at 08:33

## 2017-08-25 RX ADMIN — PHENYTOIN SODIUM SCH MLS/HR: 50 INJECTION INTRAMUSCULAR; INTRAVENOUS at 13:17

## 2017-08-25 RX ADMIN — PANTOPRAZOLE SCH MG: 40 TABLET, DELAYED RELEASE ORAL at 08:33

## 2017-08-25 RX ADMIN — PHENYTOIN SODIUM SCH MLS/HR: 50 INJECTION INTRAMUSCULAR; INTRAVENOUS at 08:37

## 2017-08-25 NOTE — HHI.PR
Subjective


Remarks


Follow up renal failure, anemia, hypertension. Patient states that she feels 

better today. Denies chest pain, dyspnea, nausea, vomiting. Blood pressure has 

been running high.





Objective


Vitals





Vital Signs








  Date Time  Temp Pulse Resp B/P (MAP) Pulse Ox O2 Delivery O2 Flow Rate FiO2


 


8/25/17 11:38 96.5 98 17 196/88 (124) 100   


 


8/25/17 07:47 96.0 93 18 136/78 (97) 99   


 


8/24/17 20:55 98.6 116 18 151/91 (111) 100   


 


8/24/17 16:50 95.6 114 16 139/75 (96) 99   





    139/65 (89)    














I/O      


 


 8/24/17 8/24/17 8/24/17 8/25/17 8/25/17 8/25/17





 06:59 14:59 22:59 06:59 14:59 22:59


 


Intake Total 240 ml 600 ml 2096 ml 120 ml  


 


Balance 240 ml 600 ml 2096 ml 120 ml  


 


      


 


Intake Oral 240 ml 600 ml 240 ml 120 ml  


 


IV Total   1856 ml   


 


# Voids 4 2 3 2  


 


# Bowel Movements 0 0 0 0  








Result Diagram:  


8/25/17 0724 8/25/17 0724





Imaging





Last Impressions








Carotid Artery Ultrasound 8/23/17 0000 Signed





Impressions: 





 Service Date/Time:  Wednesday, August 23, 2017 08:03 - CONCLUSION:  1. Mild 





 right carotid plaque with velocities corresponding to moderate, 50-69%%, 





 stenosis. Suspect that this is artifactually elevated with overall imaging 





 features most consistent with mild, less than 50%%, stenosis. 2. No 





 hemodynamically significant left carotid stenosis. 3. Antegrade vertebral 

artery 





 flow bilaterally.     Hayder Driver MD 


 


Abdomen Ultrasound 8/23/17 0000 Signed





Impressions: 





 Service Date/Time:  Wednesday, August 23, 2017 08:03 - CONCLUSION:  1. Hepatic 





 steatosis.     Francisco Waddell Jr., MD 


 


Abdomen/Pelvis CT 8/22/17 0000 Signed





Impressions: 





 Service Date/Time:  Tuesday, August 22, 2017 19:35 - CONCLUSION:  1. No acute 





 finding is identified to explain the clinical symptoms. 2. There is a 4 mm 

right 





 lower lobe and 5 mm left lower lobe on pulmonary nodule. Suggest correlating 





 with any prior imaging studies that could confirm longer-term stability. If 

none 





 are available, suggest 6 month followup noncontrast chest CT. 3. Nonacute 





 findings include severe hepatic steatosis, moderate atherosclerotic disease, 

and 





 sigmoid diverticulosis.     Dusty Forbes MD 








Objective Remarks


General: No acute distress.


Heart: Regular rate and rhythm. No murmur.


Lungs: Clear to auscultation bilaterally. No wheezes, rales, or rhonchi. 

Breathing is nonlabored.


Abdomen: Soft, nontender, nondistended.


Extremities: No lower extremity edema.


Psych: Alert and oriented.


Procedures


None


Urinary Catheter:  No


Vascular Central Line Catheter:  No





A/P


Problem List:  


(1) Generalized weakness


ICD Code:  R53.1 - Weakness


Status:  Acute


(2) Acute renal failure


ICD Code:  N17.9 - Acute kidney failure, unspecified


Status:  Acute


(3) Dehydration


ICD Code:  E86.0 - Dehydration


Status:  Acute


(4) Anemia


ICD Code:  D64.9 - Anemia, unspecified


(5) Hypertension


ICD Code:  I10 - Essential (primary) hypertension


Assessment and Plan


1. Presyncope: Patient has chronic vertigo. Orthostatic vital signs are 

negative. Symptoms likely secondary to hypovolemia. Continue IV fluids. Carotid 

ultrasound shows moderate stenosis on the right, minimal stenosis on the left. 

Echocardiogram showed ejection fraction 55-60%.


2. Acute renal failure: Creatinine improving. Continue IV fluids, decrease 

weight. Recheck BUN and creatinine in the morning.


3. Anemia: Secondary to anemia of chronic disease and iron deficiency anemia. 

Continue IV iron, dose #2 of 3 today. Monitor H&H.


4. Hypertension: Restart atenolol. Decrease IV fluids.


5. DVT prophylaxis: Heparin.


Discharge Planning


Possible discharge home with home health physical therapy tomorrow.





Problem Qualifiers





(1) Acute renal failure:  


Qualified Codes:  N17.9 - Acute kidney failure, unspecified


(2) Anemia:  








Wayne Brothers MD Aug 25, 2017 13:31

## 2017-08-26 VITALS
RESPIRATION RATE: 16 BRPM | SYSTOLIC BLOOD PRESSURE: 165 MMHG | HEART RATE: 91 BPM | OXYGEN SATURATION: 100 % | TEMPERATURE: 96.1 F | DIASTOLIC BLOOD PRESSURE: 81 MMHG

## 2017-08-26 VITALS
OXYGEN SATURATION: 98 % | TEMPERATURE: 97.3 F | DIASTOLIC BLOOD PRESSURE: 78 MMHG | RESPIRATION RATE: 16 BRPM | HEART RATE: 91 BPM | SYSTOLIC BLOOD PRESSURE: 157 MMHG

## 2017-08-26 LAB
ANION GAP SERPL CALC-SCNC: 10 MEQ/L (ref 5–15)
BASOPHILS # BLD AUTO: 0.1 TH/MM3 (ref 0–0.2)
BASOPHILS NFR BLD: 0.9 % (ref 0–2)
BUN SERPL-MCNC: 18 MG/DL (ref 7–18)
CALCIUM TP COR SERPL-MCNC: 8.2 MG/DL (ref 8.5–10.1)
CHLORIDE SERPL-SCNC: 108 MEQ/L (ref 98–107)
EOSINOPHIL # BLD: 0.1 TH/MM3 (ref 0–0.4)
EOSINOPHIL NFR BLD: 1 % (ref 0–4)
EOSINOPHIL NFR BLD: 2.4 % (ref 0–4)
ERYTHROCYTE [DISTWIDTH] IN BLOOD BY AUTOMATED COUNT: 15.7 % (ref 11.6–17.2)
GFR SERPLBLD BASED ON 1.73 SQ M-ARVRAT: 48 ML/MIN (ref 89–?)
HCO3 BLD-SCNC: 18.9 MEQ/L (ref 21–32)
HCT VFR BLD CALC: 22.8 % (ref 35–46)
HEMO FLAGS: (no result)
LYMPHOCYTES # BLD AUTO: 1 TH/MM3 (ref 1–4.8)
LYMPHOCYTES NFR BLD AUTO: 16.6 % (ref 9–44)
MCH RBC QN AUTO: 36.7 PG (ref 27–34)
MCHC RBC AUTO-ENTMCNC: 33.2 % (ref 32–36)
MCV RBC AUTO: 110.5 FL (ref 80–100)
METAMYELOCYTES NFR BLD: 1 % (ref 0–1)
MONOCYTES NFR BLD: 14.4 % (ref 0–8)
MYELOCYTES NFR BLD: 2 % (ref 0–0)
NEUTROPHILS # BLD AUTO: 4 TH/MM3 (ref 1.8–7.7)
NEUTROPHILS NFR BLD AUTO: 65.7 % (ref 16–70)
NEUTS BAND # BLD MANUAL: 5.2 TH/MM3 (ref 1.8–7.7)
NEUTS BAND NFR BLD: 14 % (ref 0–6)
NEUTS SEG NFR BLD MANUAL: 70 % (ref 16–70)
PLAT MORPH BLD: NORMAL
PLATELET # BLD: 201 TH/MM3 (ref 150–450)
PLATELET BLD QL SMEAR: NORMAL
POTASSIUM SERPL-SCNC: 3.1 MEQ/L (ref 3.5–5.1)
RBC # BLD AUTO: 2.06 MIL/MM3 (ref 4–5.3)
SCAN/DIFF: (no result)
SODIUM SERPL-SCNC: 137 MEQ/L (ref 136–145)
WBC # BLD AUTO: 6 TH/MM3 (ref 4–11)
WBC DIFF SAMPLE: 100

## 2017-08-26 RX ADMIN — PANTOPRAZOLE SCH MG: 40 TABLET, DELAYED RELEASE ORAL at 09:12

## 2017-08-26 RX ADMIN — SODIUM CHLORIDE SCH MLS/HR: 900 INJECTION, SOLUTION INTRAVENOUS at 09:12

## 2017-08-26 RX ADMIN — HEPARIN SODIUM SCH UNITS: 10000 INJECTION, SOLUTION INTRAVENOUS; SUBCUTANEOUS at 09:13

## 2017-08-26 RX ADMIN — Medication SCH ML: at 09:21

## 2017-08-26 NOTE — HHI.DCPOC
Discharge Care Plan


Diagnosis:  


(1) Acute renal failure


(2) Dehydration


(3) Generalized weakness


(4) Anemia


(5) Hypertension


Goals to Promote Your Health


* To prevent worsening of your condition and complications


* To maintain your health at the optimal level


Directions to Meet Your Goals


*** Take your medications as prescribed


*** Follow your dietary instruction


*** Follow activity as directed








*** Keep your appointments as scheduled


*** Take your immunizations and boosters as scheduled


*** If your symptoms worsen call your PCP, if no PCP go to Urgent Care Center 

or Emergency Room***


*** Smoking is Dangerous to Your Health. Avoid second hand smoke***


***Call the 24-hour hour crisis hotline for domestic abuse at 1-650.987.8942***











Wayne Brothers MD Aug 26, 2017 11:58

## 2017-08-26 NOTE — HHI.DS
__________________________________________________





Discharge Summary


Admission Date


Aug 22, 2017 at 19:13


Discharge Date:  Aug 26, 2017


Admitting Diagnosis





acute renal failure, failure to thrive, dehydration





(1) Generalized weakness


ICD Code:  R53.1 - Weakness


Status:  Acute


(2) Acute renal failure


ICD Code:  N17.9 - Acute kidney failure, unspecified


Status:  Acute


(3) Dehydration


ICD Code:  E86.0 - Dehydration


Status:  Acute


(4) Anemia


ICD Code:  D64.9 - Anemia, unspecified


(5) Hypertension


ICD Code:  I10 - Essential (primary) hypertension


Procedures


None


Brief History - From Admission


when i got up, i could not stand up, dizzy, heart beating fast , 


no sweating 


no nausea at thsi time, but have been a few days time 


no room spinning 


no vomiting 


no syncope, no falls, not hitting head


but felt so weak, could not even walk to car, had to call 911





not this bad, but has had other similar eposides


went to pcp previously, and was told to go to er if it hapens again





but had nausea and diarrhea- but has had it on and off for a while- about 2 

weeks


diarrhea is more of one large amount, then a few small amounts


no black or red color 


no vomiting 


no fever 


had cough a few days - white color mucus, no more cough now


no abdominal pain or chest pain or shortness of breath


has hx of vertigo


no urine symptoms





egd - a month ago- found two little ulcers there, but was not actively bleeding

; but had beginning stages of barretts . Dr Morel


last clonoscopy about 5yrs ago, was told not due for one now


CBC/BMP:  


8/26/17 0551                                                                   

             8/26/17 0551





Significant Findings





Laboratory Tests








Test


  8/24/17


05:55 8/25/17


07:24 8/26/17


05:51


 


Red Blood Count


  1.93 MIL/MM3


(4.00-5.30) 2.09 MIL/MM3


(4.00-5.30) 2.06 MIL/MM3


(4.00-5.30)


 


Hemoglobin


  7.4 GM/DL


(11.6-15.3) 7.7 GM/DL


(11.6-15.3) 7.6 GM/DL


(11.6-15.3)


 


Hematocrit


  21.3 %


(35.0-46.0) 23.3 %


(35.0-46.0) 22.8 %


(35.0-46.0)


 


Mean Corpuscular Volume


  110.4 FL


(80.0-100.0) 111.5 FL


(80.0-100.0) 110.5 FL


(80.0-100.0)


 


Mean Corpuscular Hemoglobin


  38.3 PG


(27.0-34.0) 36.7 PG


(27.0-34.0) 36.7 PG


(27.0-34.0)


 


Monocytes (%) (Auto)


  13.7 %


(0.0-8.0) 14.3 %


(0.0-8.0) 14.4 %


(0.0-8.0)


 


Lymphocytes # (Auto)


  0.9 TH/MM3


(1.0-4.8) 0.8 TH/MM3


(1.0-4.8) 


 


 


Blood Urea Nitrogen


  29 MG/DL


(7-18) 21 MG/DL


(7-18) 


 


 


Creatinine


  2.30 MG/DL


(0.50-1.00) 1.67 MG/DL


(0.50-1.00) 1.13 MG/DL


(0.50-1.00)


 


Total Protein


  5.0 GM/DL


(6.4-8.2) 5.0 GM/DL


(6.4-8.2) 5.1 GM/DL


(6.4-8.2)


 


Calcium Level


  6.7 MG/DL


(8.5-10.1) 7.1 MG/DL


(8.5-10.1) 7.1 MG/DL


(8.5-10.1)


 


Sodium Level


  135 MEQ/L


(136-145) 


  


 


 


Potassium Level


  3.3 MEQ/L


(3.5-5.1) 


  3.1 MEQ/L


(3.5-5.1)


 


Carbon Dioxide Level


  20.7 MEQ/L


(21.0-32.0) 20.3 MEQ/L


(21.0-32.0) 18.9 MEQ/L


(21.0-32.0)


 


Estimat Glomerular Filtration


Rate 21 ML/MIN


(>89) 31 ML/MIN


(>89) 48 ML/MIN


(>89)


 


Protein Corrected Calcium


  7.8 MG/DL


(8.5-10.1) 8.2 MG/DL


(8.5-10.1) 8.2 MG/DL


(8.5-10.1)


 


Iron Level


  33 MCG/DL


() 


  


 


 


Total Iron Binding Capacity


  154 MCG/DL


(250-450) 


  


 


 


Ferritin


  563 NG/ML


(8-252) 


  


 


 


Metamyelocytes  2 % (0-1)  


 


Myelocytes  1 % (0-0)  2 % (0-0) 


 


Promyelocytes  3 % (0-0)  


 


Random Glucose


  


  70 MG/DL


() 


 


 


Albumin


  


  2.1 GM/DL


(3.4-5.0) 


 


 


Alkaline Phosphatase


  


  221 U/L


() 


 


 


Aspartate Amino Transf


(AST/SGOT) 


  127 U/L


(15-37) 


 


 


Direct Bilirubin


  


  0.6 MG/DL


(0.0-0.2) 


 


 


Chloride Level


  


  111 MEQ/L


() 108 MEQ/L


()


 


Band Neutrophils %   14 % (0-6) 


 


Lymphocytes %   6 % (9-44) 








Imaging





Last Impressions








Carotid Artery Ultrasound 8/23/17 0000 Signed





Impressions: 





 Service Date/Time:  Wednesday, August 23, 2017 08:03 - CONCLUSION:  1. Mild 





 right carotid plaque with velocities corresponding to moderate, 50-69%%, 





 stenosis. Suspect that this is artifactually elevated with overall imaging 





 features most consistent with mild, less than 50%%, stenosis. 2. No 





 hemodynamically significant left carotid stenosis. 3. Antegrade vertebral 

artery 





 flow bilaterally.     Hayder Driver MD 


 


Abdomen Ultrasound 8/23/17 0000 Signed





Impressions: 





 Service Date/Time:  Wednesday, August 23, 2017 08:03 - CONCLUSION:  1. Hepatic 





 steatosis.     Francisco Waddell Jr., MD 


 


Abdomen/Pelvis CT 8/22/17 0000 Signed





Impressions: 





 Service Date/Time:  Tuesday, August 22, 2017 19:35 - CONCLUSION:  1. No acute 





 finding is identified to explain the clinical symptoms. 2. There is a 4 mm 

right 





 lower lobe and 5 mm left lower lobe on pulmonary nodule. Suggest correlating 





 with any prior imaging studies that could confirm longer-term stability. If 

none 





 are available, suggest 6 month followup noncontrast chest CT. 3. Nonacute 





 findings include severe hepatic steatosis, moderate atherosclerotic disease, 

and 





 sigmoid diverticulosis.     Dusty Forbes MD 








PE at Discharge


General: No acute distress.


Heart: Regular rate and rhythm. No murmur.


Lungs: Clear to auscultation bilaterally. No wheezes, rales, or rhonchi. 

Breathing is nonlabored.


Abdomen: Soft, nontender, nondistended.


Extremities: No lower extremity edema.


Psych: Alert and oriented.


Pt update on day of discharge


The patient has no complaints at this time. She wants to go home today. Denies 

chest pain, dyspnea, nausea, vomiting.


Hospital Course


The patient was admitted for further management of acute renal failure, 

lightheadedness. IV fluids were continued. BUN/Creatinine improved. Near 

syncopal episode prior to admission felt to be secondary to hypovolemia. The 

patient's BP increased and she was very concerned that she was not receiving 

her home dose of atenolol. Her BP medications were restarted and her home dose 

was resumed. PT recommended home health PT. The patient was felt to be stable 

for discharge home. She was advised to follow up with her PCP for management of 

her hypertension and possible adjustment of her BP medications.


Pt Condition on Discharge:  Stable


Discharge Disposition:  Disch w/ Home Health Serv


Discharge Time:  > 30 minutes


Discharge Instructions


DIET: Follow Instructions for:  Heart Healthy Diet


Activities you can perform:  Regular-No Restrictions


Follow up Referrals:  


PCP Follow-up - 1 Week





Continued Medications:  


Atenolol (Atenolol) 50 Mg Tab


50 MG PO HS for Blood Pressure Management, #30 TAB 0 Refills





Atenolol (Atenolol) 100 Mg Tab


100 MG PO DAILY for Blood Pressure Management, #30 TAB 0 Refills





Diazepam (Diazepam) 5 Mg Tab


5 MG PO BID PRN for ANXIETY, TAB 0 Refills





Omeprazole (Omeprazole) 40 Mg Cap


40 MG PO BID, #30 CAP 0 Refills





Risedronate DR (Atelvia) 35 Mg Tabdr


35 MG PO Q7D for Manage Osteoporosis, #4 TAB 0 Refills





 


Discontinued Medications:  


Losartan (Losartan) 100 Mg Tab


100 MG PO DAILY for Blood Pressure Management, #30 TAB 0 Refills

















Wayne Brothers MD Aug 26, 2017 12:00

## 2017-08-28 ENCOUNTER — HOSPITAL ENCOUNTER (EMERGENCY)
Dept: HOSPITAL 17 - PHED | Age: 67
Discharge: HOME | End: 2017-08-28
Payer: MEDICARE

## 2017-08-28 VITALS
SYSTOLIC BLOOD PRESSURE: 134 MMHG | DIASTOLIC BLOOD PRESSURE: 79 MMHG | RESPIRATION RATE: 16 BRPM | HEART RATE: 96 BPM | OXYGEN SATURATION: 99 % | TEMPERATURE: 98 F

## 2017-08-28 VITALS — HEIGHT: 64 IN | WEIGHT: 130.07 LBS | BODY MASS INDEX: 22.21 KG/M2

## 2017-08-28 VITALS — SYSTOLIC BLOOD PRESSURE: 131 MMHG | DIASTOLIC BLOOD PRESSURE: 66 MMHG

## 2017-08-28 VITALS — OXYGEN SATURATION: 96 %

## 2017-08-28 DIAGNOSIS — R07.9: ICD-10-CM

## 2017-08-28 DIAGNOSIS — R60.0: Primary | ICD-10-CM

## 2017-08-28 DIAGNOSIS — I10: ICD-10-CM

## 2017-08-28 DIAGNOSIS — N17.9: ICD-10-CM

## 2017-08-28 LAB
ALP SERPL-CCNC: 291 U/L (ref 45–117)
ALT SERPL-CCNC: 38 U/L (ref 10–53)
ANION GAP SERPL CALC-SCNC: 11 MEQ/L (ref 5–15)
APTT BLD: 29.9 SEC (ref 24.3–30.1)
AST SERPL-CCNC: 184 U/L (ref 15–37)
BASOPHILS # BLD AUTO: 0.2 TH/MM3 (ref 0–0.2)
BASOPHILS NFR BLD: 3.1 % (ref 0–2)
BILIRUB SERPL-MCNC: 0.9 MG/DL (ref 0.2–1)
BUN SERPL-MCNC: 12 MG/DL (ref 7–18)
CHLORIDE SERPL-SCNC: 107 MEQ/L (ref 98–107)
CK SERPL-CCNC: 63 U/L (ref 26–192)
EOSINOPHIL # BLD: 0.1 TH/MM3 (ref 0–0.4)
EOSINOPHIL NFR BLD: 1.5 % (ref 0–4)
ERYTHROCYTE [DISTWIDTH] IN BLOOD BY AUTOMATED COUNT: 15.9 % (ref 11.6–17.2)
GFR SERPLBLD BASED ON 1.73 SQ M-ARVRAT: 70 ML/MIN (ref 89–?)
HCO3 BLD-SCNC: 20.4 MEQ/L (ref 21–32)
HCT VFR BLD CALC: 26.6 % (ref 35–46)
HEMO FLAGS: (no result)
INR PPP: 1 RATIO
LYMPHOCYTES # BLD AUTO: 1.1 TH/MM3 (ref 1–4.8)
LYMPHOCYTES NFR BLD AUTO: 17.2 % (ref 9–44)
MCH RBC QN AUTO: 35 PG (ref 27–34)
MCHC RBC AUTO-ENTMCNC: 32.8 % (ref 32–36)
MCV RBC AUTO: 106.7 FL (ref 80–100)
MONOCYTES NFR BLD: 12.9 % (ref 0–8)
NEUTROPHILS # BLD AUTO: 4.3 TH/MM3 (ref 1.8–7.7)
NEUTROPHILS NFR BLD AUTO: 65.3 % (ref 16–70)
PLATELET # BLD: 251 TH/MM3 (ref 150–450)
POTASSIUM SERPL-SCNC: 3 MEQ/L (ref 3.5–5.1)
PROTHROMBIN TIME: 11 SEC (ref 9.8–11.6)
RBC # BLD AUTO: 2.49 MIL/MM3 (ref 4–5.3)
SODIUM SERPL-SCNC: 138 MEQ/L (ref 136–145)
WBC # BLD AUTO: 6.5 TH/MM3 (ref 4–11)

## 2017-08-28 PROCEDURE — 71010: CPT

## 2017-08-28 PROCEDURE — 85610 PROTHROMBIN TIME: CPT

## 2017-08-28 PROCEDURE — 93005 ELECTROCARDIOGRAM TRACING: CPT

## 2017-08-28 PROCEDURE — 93970 EXTREMITY STUDY: CPT

## 2017-08-28 PROCEDURE — 99285 EMERGENCY DEPT VISIT HI MDM: CPT

## 2017-08-28 PROCEDURE — 85025 COMPLETE CBC W/AUTO DIFF WBC: CPT

## 2017-08-28 PROCEDURE — 85730 THROMBOPLASTIN TIME PARTIAL: CPT

## 2017-08-28 PROCEDURE — 80053 COMPREHEN METABOLIC PANEL: CPT

## 2017-08-28 PROCEDURE — 84484 ASSAY OF TROPONIN QUANT: CPT

## 2017-08-28 PROCEDURE — 82550 ASSAY OF CK (CPK): CPT

## 2017-08-28 NOTE — PD
HPI


Chief Complaint:  Edema


Time Seen by Provider:  15:52


Travel History


International Travel<30 days:  No


Contact w/Intl Traveler<30days:  No


Traveled to known affect area:  No





History of Present Illness


HPI


Patient is a 67-year-old female presents emergency department for bilateral 

pedal edema.  Patient was just released the hospital for an acute kidney 

injury.  During that time it was noted that towards the end of her admission 

her blood pressure was a low which was bolused additional fluid prior to 

discharge.  Patient states she's never had any shortness of breath no problems 

with lying down, no shortness of breath on exertion.  Patient states she drinks 

only intermittently.  Denies a history of liver disease.  She states she feels 

fine except for the swelling in the legs and the mild discomfort.  Denies any 

fevers denies any chest pain.





PFSH


Past Medical History


Arthritis:  Yes


Blood Disorders:  No


Anxiety:  Yes


Depression:  Yes


Cancer:  Yes (LEFT BREAST LUMPECTOMY W/ LYMPH NODE REMOVED '07)


Cardiovascular Problems:  Yes (htn)


Diabetes:  No


Gastrointestinal Disorders:  No


Glaucoma:  No


Genitourinary:  Yes


Hepatitis:  No


Hiatal Hernia:  No


Hypertension:  No


Immune Disorder:  No


Musculoskeletal:  Yes


Neurologic:  Yes (RLS - CHRONIC INSOMNIA)


Reproductive:  No


Respiratory:  No


Thyroid Disease:  No


Tetanus Vaccination:  > 5 Years


Influenza Vaccination:  Yes


Pregnant?:  Not Pregnant





Past Surgical History


Abdominal Surgery:  No


Cardiac Surgery:  No


Ear Surgery:  No


Endocrine Surgery:  No


Eye Surgery:  No


Genitourinary Surgery:  No


Gynecologic Surgery:  Yes (HYSTERECTOMY X 10YRS AGO)


Hysterectomy:  Yes


Oral Surgery:  Yes (TONSILLECTOMY / AS A CHILD)


Pacemaker:  No


Thoracic Surgery:  No


Tonsillectomy:  Yes


Other Surgery:  Yes (RT HIP)





Social History


Alcohol Use:  Yes (WINE DAILY)


Tobacco Use:  No


Substance Use:  No





Allergies-Medications


(Allergen,Severity, Reaction):  


Uncoded Allergies:  


     raw egg (Allergy, Unknown, 8/22/17)


Reported Meds & Prescriptions





Reported Meds & Active Scripts


Active


Reported


Atelvia (Risedronate) 35 Mg Tabdr 35 Mg PO Q7D


Diazepam 5 Mg Tab 5 Mg PO BID PRN


Omeprazole 40 Mg Cap 40 Mg PO BID


Atenolol 100 Mg Tab 100 Mg PO DAILY


Atenolol 50 Mg Tab 50 Mg PO HS








Review of Systems


Except as stated in HPI:  all other systems reviewed are Neg





Physical Exam


Narrative


GENERAL: Well-developed well-nourisheddistress.


SKIN: Focused skin assessment warm/dry.


HEAD: Atraumatic. Normocephalic. 


EYES: Pupils equal and round. No scleral icterus. No injection or drainage. 


ENT: No nasal bleeding or discharge.  Mucous membranes pink and moist.


NECK: Trachea midline. No JVD. 


CARDIOVASCULAR: Regular rate and rhythm.  No murmur appreciated.


RESPIRATORY: No accessory muscle use. Clear to auscultation. Breath sounds 

equal bilaterally. 


GASTROINTESTINAL: Abdomen soft, non-tender, nondistended. Hepatic and splenic 

margins not palpable. 


MUSCULOSKELETAL: No obvious deformities. No clubbing.  No cyanosis.  2+ pitting 

edema from the knee cap distally, I do not appreciate any edema above the level 

at knee However she states that it does seem a little swelling.


NEUROLOGICAL: Awake and alert. No obvious cranial nerve deficits.  Motor 

grossly within normal limits. Normal speech.


PSYCHIATRIC: Appropriate mood and affect; insight and judgment normal.





Data


Data


Last Documented VS





Vital Signs








  Date Time  Temp Pulse Resp B/P (MAP) Pulse Ox O2 Delivery O2 Flow Rate FiO2


 


8/28/17 19:08  77 18 131/66 (87) 100   


 


8/28/17 16:08      Room Air  


 


8/28/17 15:10 98.0       








Orders





 Orders


Electrocardiogram (8/28/17 16:03)


Ckmb (Isoenzyme) Profile (8/28/17 16:03)


Complete Blood Count With Diff (8/28/17 16:03)


Comprehensive Metabolic Panel (8/28/17 16:03)


Prothrombin Time / Inr (Pt) (8/28/17 16:03)


Act Partial Throm Time (Ptt) (8/28/17 16:03)


Troponin I (8/28/17 16:03)


Chest, Single Ap (8/28/17 16:03)


Ecg Monitoring (8/28/17 16:03)


Iv Access Insert/Monitor (8/28/17 16:03)


Oximetry (8/28/17 16:03)


Oxygen Administration (8/28/17 16:03)


Sodium Chloride 0.9% Flush (Ns Flush) (8/28/17 16:15)


Us Leg Venous Doppler Bilat (8/28/17 16:03)





Labs





Laboratory Tests








Test


  8/28/17


16:30


 


White Blood Count 6.5 TH/MM3 


 


Red Blood Count 2.49 MIL/MM3 


 


Hemoglobin 8.7 GM/DL 


 


Hematocrit 26.6 % 


 


Mean Corpuscular Volume 106.7 FL 


 


Mean Corpuscular Hemoglobin 35.0 PG 


 


Mean Corpuscular Hemoglobin


Concent 32.8 % 


 


 


Red Cell Distribution Width 15.9 % 


 


Platelet Count 251 TH/MM3 


 


Mean Platelet Volume 8.1 FL 


 


Neutrophils (%) (Auto) 65.3 % 


 


Lymphocytes (%) (Auto) 17.2 % 


 


Monocytes (%) (Auto) 12.9 % 


 


Eosinophils (%) (Auto) 1.5 % 


 


Basophils (%) (Auto) 3.1 % 


 


Neutrophils # (Auto) 4.3 TH/MM3 


 


Lymphocytes # (Auto) 1.1 TH/MM3 


 


Monocytes # (Auto) 0.8 TH/MM3 


 


Eosinophils # (Auto) 0.1 TH/MM3 


 


Basophils # (Auto) 0.2 TH/MM3 


 


CBC Comment DIFF FINAL 


 


Differential Comment  


 


Prothrombin Time 11.0 SEC 


 


Prothromb Time International


Ratio 1.0 RATIO 


 


 


Activated Partial


Thromboplast Time 29.9 SEC 


 


 


Blood Urea Nitrogen 12 MG/DL 


 


Creatinine 0.82 MG/DL 


 


Random Glucose 84 MG/DL 


 


Total Protein 6.3 GM/DL 


 


Albumin 2.6 GM/DL 


 


Calcium Level 7.5 MG/DL 


 


Alkaline Phosphatase 291 U/L 


 


Aspartate Amino Transf


(AST/SGOT) 184 U/L 


 


 


Alanine Aminotransferase


(ALT/SGPT) 38 U/L 


 


 


Total Bilirubin 0.9 MG/DL 


 


Sodium Level 138 MEQ/L 


 


Potassium Level 3.0 MEQ/L 


 


Chloride Level 107 MEQ/L 


 


Carbon Dioxide Level 20.4 MEQ/L 


 


Anion Gap 11 MEQ/L 


 


Estimat Glomerular Filtration


Rate 70 ML/MIN 


 


 


Total Creatine Kinase 63 U/L 


 


Troponin I


  LESS THAN 0.02


NG/ML











MDM


Medical Decision Making


Medical Screen Exam Complete:  Yes


Emergency Medical Condition:  Yes


Differential Diagnosis


Acute kidney injury, rule out peptic kidney failure, CHF seems unlikely, 

peripheral vascular congestion.


Narrative Course


Patient roomed emergency department, have reviewed her lab tests and her AST is 

184 and ALT is 38.  Alkaline phosphatase is 291.  And her albumin is somewhat 

low at 2.6, troponin negative.  She is mildly anemic with a hemoglobin of 8.7 

which appears to be better than previous, MCV is 106.7.  These labs are 

consistent with a chronic alcoholic however the patient adamantly declines.  I 

discussed with her that she needs to have further workup of her liver disease 

and possibly even a liver biopsy but this can be done outpatient.  At this time 

I do not see any emergent cause of her edema and recommended HUSEYIN hose as well 

as elevating the legs.  Given her history of recent acute kidney injury and do 

not think that water pills are appropriate.  She actually does appear quite 

well in no obvious distress and I think she is stable for discharge.  Of note 

the patient did recently receive an ultrasound of her gallbladder which which 

was negative for gallbladder disease but did show some fatty infiltrated liver.

  Recent CAT scan on August 22 did show severe hepatic steatosis.





The hemoglobin is improved from her last admission in AST and ALT are static.





Diagnosis





 Primary Impression:  


 Pedal edema





***Additional Instructions:  


Follow-up with your regular physician tomorrow by phone.  Also call Dr. Morel 

to discuss your liver tests.  Avoid alcohol intake.  Avoid Tylenol.





Keep your legs elevated as much as possible.





Use HUSEYIN how.


Disposition:  01 DISCHARGE HOME


Condition:  Stable











Jose Palacio MD Aug 28, 2017 16:11

## 2017-08-28 NOTE — RADRPT
EXAM DATE/TIME:  08/28/2017 17:00 

 

[facility] COMPARISON:     

No previous studies available for comparison.

        

 

 

INDICATIONS :                

Bilateral leg swelling. 

            

 

MEDICAL HISTORY :        

General weakness, near syncope. Liver and kidney failure.

 

SURGICAL HISTORY :      

Tonsillectomy. Right thumb surgery. Hysterectomy. Left breast lumpectomy.

 

ENCOUNTER:     

Subsequent

 

ACUITY:     

4 - 6 days

 

PAIN SCORE:      

2/10

 

LOCATION:      

Bilateral  legs. 

                       

 

TECHNIQUE:     

Venous ultrasound of the left and right leg was performed from the inguinal ligament to the proximal 
calf.  Real-time, color Doppler and spectral tracing, compression and augmentation techniques were us
ed.  

 

FINDINGS:     

 

RIGHT LEG:     

There is normal compressibility of the deep venous system from the inguinal region to the proximal ca
lf.  No echogenic clot is seen in the lumen of the common femoral, femoral, popliteal, and posterior 
tibial veins.  There is a normal response of the venous system to proximal and distal augmentation an
d respiration.  Small 3 cm popliteal cyst is noted.

 

LEFT LEG:     

There is normal compressibility of the deep venous system from the inguinal region to the proximal ca
lf.  No echogenic clot is seen in the lumen of the common femoral, femoral, popliteal, and posterior 
tibial veins.  There is a normal response of the venous system to proximal and distal augmentation an
d respiration.  

 

CONCLUSION:       

Negative for deep venous thrombosis.  

 

 

 

 Tawanda Seymour MD FACR on August 28, 2017 at 17:42           

Board Certified Radiologist.

 This report was verified electronically.

## 2017-08-28 NOTE — RADRPT
EXAM DATE/TIME:  08/28/2017 16:07 

 

[facility] COMPARISON:     

No previous studies available for comparison.

 

                     

INDICATIONS :     

Chest pain.

                     

 

MEDICAL HISTORY :     

Hypertension.  Carcinoma, breast.        

 

SURGICAL HISTORY :        

Lumpectomy, left.

 

ENCOUNTER:     

Initial                                        

 

ACUITY:     

1 week      

 

PAIN SCORE:     

3/10

 

LOCATION:     

Bilateral chest 

 

FINDINGS:     

A single view of the chest demonstrates the lungs to be symmetrically aerated without evidence of mas
s, infiltrate or effusion.  The cardiomediastinal contours are unremarkable.  Osseous structures are 
intact.

 

CONCLUSION:     Normal examination.  

 

 

 

 Francisco Waddell Jr., MD on August 28, 2017 at 16:49           

Board Certified Radiologist.

 This report was verified electronically.

## 2017-08-29 NOTE — EKG
Date Performed: 08/28/2017       Time Performed: 16:20:34

 

PTAGE:      67 years

 

EKG:      Sinus rhythm 

 

 LOW QRS VOLTAGE IN PRECORDIAL LEADS BORDERLINE ECG

 

PREVIOUS TRACING       : 01/01/2013 02.22 Compared to prior tracing no significant change

 

DOCTOR:   Anthony Pond  Interpretating Date/Time  08/29/2017 09:13:10

## 2017-12-31 NOTE — PD
HPI


Chief Complaint:  generalized weakness


Time Seen by Provider:  16:09


Travel History


International Travel<30 days:  No


Contact w/Intl Traveler<30days:  No


Traveled to known affect area:  No





History of Present Illness


HPI


67-year-old female came to the emergency room brought by her  with 

history of generalized weakness and near syncopal episodes since 3 PM.  However 

patient has been progressively having dizziness, poor appetite and weak for 

past few weeks.  She has been seen by her primary care as per the patient.  

However it does not seem like any substantial diagnosis has been made so far.  

Patient said that she lost about 15 pounds in less than 6 months.  At one point 

she was told by her primary care that her liver functions are elevated.  No 

history of any pain anywhere.  She drinks about 2-2 and half glasses of wine 

every day.  Vital signs are stable otherwise.  Patient did not have a syncopal 

episode per se.  Patient says that she has been having increased frequency of 

stool in the past few days.  She says the stool feels globby.  No blood in her 

stool





[facility]


Past Medical History


*** Narrative Medical


List of her past medical, surgical, social and family history is reviewed from 

the nursing note.


Blood Disorders:  No


Anxiety:  Yes


Depression:  Yes


Cancer:  Yes (LEFT BREAST LUMPECTOMY W/ LYMPH NODE REMOVED '07)


Cardiovascular Problems:  No


Diabetes:  No


Glaucoma:  No


Genitourinary:  Yes


Hepatitis:  No


Hiatal Hernia:  No


Hypertension:  No


Immune Disorder:  No


Musculoskeletal:  Yes


Neurologic:  No


Reproductive:  No


Respiratory:  No


Thyroid Disease:  No





Past Surgical History


Abdominal Surgery:  No


Cardiac Surgery:  No


Ear Surgery:  No


Endocrine Surgery:  No


Eye Surgery:  No


Genitourinary Surgery:  No


Gynecologic Surgery:  Yes (HYSTERECTOMY X 10YRS AGO)


Hysterectomy:  Yes


Oral Surgery:  Yes (TONSILLECTOMY / AS A CHILD)


Pacemaker:  No


Thoracic Surgery:  No


Tonsillectomy:  Yes


Other Surgery:  Yes (RT HIP)





Social History


Alcohol Use:  Yes (WINE DAILY)


Tobacco Use:  No


Substance Use:  No





Allergies-Medications


(Allergen,Severity, Reaction):  


Uncoded Allergies:  


     raw egg (Allergy, Unknown, 8/22/17)


Comments


List of her allergies reviewed from the nursing note.


Reported Meds & Prescriptions





Reported Meds & Active Scripts


Active


Reported


Atelvia (Risedronate) 35 Mg Tabdr 35 Mg PO Q7D


Diazepam 5 Mg Tab 5 Mg PO BID PRN


Omeprazole 40 Mg Cap 40 Mg PO BID


Atenolol 100 Mg Tab 100 Mg PO DAILY


Atenolol 50 Mg Tab 50 Mg PO HS


Losartan (Losartan Potassium) 100 Mg Tab 100 Mg PO DAILY





Narrative Medication


List of her home medications reviewed from the nursing note.





Review of Systems


Except as stated in HPI:  all other systems reviewed are Neg





Physical Exam


Narrative


GENERAL: Awake, alert, moderate distress


SKIN: Focused skin assessment warm/dry.  Jaundice, spider hemangioma, caput 

medusae


HEAD: Atraumatic. Normocephalic. 


EYES: Pupils equal and round.  Scleral icterus. No injection or drainage. 


ENT: No nasal bleeding or discharge.  Mucous membranes pink and moist.


NECK: Trachea midline. No JVD. 


CARDIOVASCULAR: Regular rate and rhythm.  No murmur appreciated.


RESPIRATORY: No accessory muscle use. Clear to auscultation. Breath sounds 

equal bilaterally. 


GASTROINTESTINAL: Abdomen soft, non-tender, nondistended.  Hepatic megaly 5 

finger breath below the subcostal margin


MUSCULOSKELETAL: No obvious deformities. No clubbing.  No cyanosis.  No edema. 


NEUROLOGICAL: Awake and alert. No obvious cranial nerve deficits.  Motor 

grossly within normal limits. Normal speech.


PSYCHIATRIC: Appropriate mood and affect; insight and judgment normal.





Data


Data


Last Documented VS





Vital Signs








  Date Time  Temp Pulse Resp B/P (MAP) Pulse Ox O2 Delivery O2 Flow Rate FiO2


 


8/22/17 19:08  92 14 128/71 (90) 100 Room Air  


 


8/22/17 16:13 98.5       








Orders





 Orders


Complete Blood Count With Diff (8/22/17 16:54)


Comprehensive Metabolic Panel (8/22/17 16:54)


Lipase (8/22/17 16:54)


Prothrombin Time / Inr (Pt) (8/22/17 16:54)


Iv Access Insert/Monitor (8/22/17 16:54)


Ecg Monitoring (8/22/17 16:54)


Oximetry (8/22/17 16:54)


Sodium Chlor 0.9% 1000 Ml Inj (Ns 1000 M (8/22/17 16:54)


Sodium Chloride 0.9% Flush (Ns Flush) (8/22/17 17:00)


Direct Bilirubin (8/22/17 16:54)


Ct Abd/Pel W/O Iv Contrast (8/22/17 )


Admit Order (Ed Use Only) (8/22/17 19:11)





Labs





Laboratory Tests








Test


  8/22/17


17:00


 


White Blood Count 6.5 TH/MM3 


 


Red Blood Count 2.23 MIL/MM3 


 


Hemoglobin 8.5 GM/DL 


 


Hematocrit 24.2 % 


 


Mean Corpuscular Volume 108.9 FL 


 


Mean Corpuscular Hemoglobin 38.1 PG 


 


Mean Corpuscular Hemoglobin


Concent 35.0 % 


 


 


Red Cell Distribution Width 16.5 % 


 


Platelet Count 260 TH/MM3 


 


Mean Platelet Volume 9.0 FL 


 


Neutrophils (%) (Auto) 77.6 % 


 


Lymphocytes (%) (Auto) 6.6 % 


 


Monocytes (%) (Auto) 14.1 % 


 


Eosinophils (%) (Auto) 0.5 % 


 


Basophils (%) (Auto) 1.2 % 


 


Neutrophils # (Auto) 5.0 TH/MM3 


 


Lymphocytes # (Auto) 0.4 TH/MM3 


 


Monocytes # (Auto) 0.9 TH/MM3 


 


Eosinophils # (Auto) 0.0 TH/MM3 


 


Basophils # (Auto) 0.1 TH/MM3 


 


CBC Comment DIFF FINAL 


 


Differential Comment  


 


Prothrombin Time 12.7 SEC 


 


Prothromb Time International


Ratio 1.1 RATIO 


 


 


Blood Urea Nitrogen 29 MG/DL 


 


Creatinine 2.64 MG/DL 


 


Random Glucose 89 MG/DL 


 


Total Protein 5.9 GM/DL 


 


Albumin 2.7 GM/DL 


 


Calcium Level 7.9 MG/DL 


 


Alkaline Phosphatase 262 U/L 


 


Aspartate Amino Transf


(AST/SGOT) 156 U/L 


 


 


Alanine Aminotransferase


(ALT/SGPT) 30 U/L 


 


 


Total Bilirubin 1.8 MG/DL 


 


Direct Bilirubin 0.8 MG/DL 


 


Sodium Level 128 MEQ/L 


 


Potassium Level 3.9 MEQ/L 


 


Chloride Level 90 MEQ/L 


 


Carbon Dioxide Level 24.7 MEQ/L 


 


Anion Gap 13 MEQ/L 


 


Estimat Glomerular Filtration


Rate 18 ML/MIN 


 


 


Lipase 124 U/L 











[facility]


Medical Decision Making


Medical Screen Exam Complete:  Yes


Emergency Medical Condition:  Yes


Medical Record Reviewed:  Yes


Differential Diagnosis


Pancreatic cancer, CABG cancer, gallbladder cancer, alcoholic hepatitis


Narrative Course


5:59 PM blood test results are pending.  CAT scan of the abdomen and pelvis is 

pending.  My suspicion is really high for malignancy and obstructive jaundice.  

She will most probably will need to be admitted.





6:38 PM blood test results of back and patient has significant elevation of her 

BUN and creatinine.  She was given a liter of fluid bolus.  Her bilirubin is 

elevated but not as high as I had expected.  Awaiting for the CAT scan.  She 

will require admission.





Procedures


EKG Prior to Arrival:  No





Diagnosis





 Primary Impression:  


 Dehydration


 Additional Impressions:  


 Acute renal failure


 Qualified Codes:  N17.9 - Acute kidney failure, unspecified


 Hyperbilirubinemia


 Generalized weakness


 Failure to thrive in adult





Admitting Information


Admitting Physician Requests:  Admit











Lalitha Escobar MD Aug 22, 2017 16:09
Statement Selected

## 2018-03-19 ENCOUNTER — HOSPITAL ENCOUNTER (EMERGENCY)
Dept: HOSPITAL 17 - NEPC | Age: 68
Discharge: HOME | End: 2018-03-19
Payer: MEDICARE

## 2018-03-19 VITALS
DIASTOLIC BLOOD PRESSURE: 68 MMHG | RESPIRATION RATE: 20 BRPM | HEART RATE: 71 BPM | OXYGEN SATURATION: 99 % | SYSTOLIC BLOOD PRESSURE: 132 MMHG

## 2018-03-19 VITALS
HEART RATE: 72 BPM | DIASTOLIC BLOOD PRESSURE: 93 MMHG | RESPIRATION RATE: 18 BRPM | OXYGEN SATURATION: 99 % | TEMPERATURE: 98.7 F | SYSTOLIC BLOOD PRESSURE: 227 MMHG

## 2018-03-19 VITALS — OXYGEN SATURATION: 100 % | SYSTOLIC BLOOD PRESSURE: 156 MMHG | DIASTOLIC BLOOD PRESSURE: 80 MMHG | HEART RATE: 68 BPM

## 2018-03-19 VITALS — HEIGHT: 64 IN | BODY MASS INDEX: 20.02 KG/M2 | WEIGHT: 117.24 LBS
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
## 2018-03-19 DIAGNOSIS — F32.9: ICD-10-CM

## 2018-03-19 DIAGNOSIS — M19.90: ICD-10-CM

## 2018-03-19 DIAGNOSIS — G25.81: ICD-10-CM

## 2018-03-19 DIAGNOSIS — N39.0: ICD-10-CM

## 2018-03-19 DIAGNOSIS — S06.0X1A: Primary | ICD-10-CM

## 2018-03-19 DIAGNOSIS — I10: ICD-10-CM

## 2018-03-19 DIAGNOSIS — F41.9: ICD-10-CM

## 2018-03-19 DIAGNOSIS — B95.7: ICD-10-CM

## 2018-03-19 LAB
BACTERIA #/AREA URNS HPF: (no result) /HPF
BASOPHILS # BLD AUTO: 0 TH/MM3 (ref 0–0.2)
BASOPHILS NFR BLD: 0.8 % (ref 0–2)
BUN SERPL-MCNC: 10 MG/DL (ref 7–18)
CALCIUM SERPL-MCNC: 8.2 MG/DL (ref 8.5–10.1)
CHLORIDE SERPL-SCNC: 104 MEQ/L (ref 98–107)
COLOR UR: YELLOW
CREAT SERPL-MCNC: 0.51 MG/DL (ref 0.5–1)
EOSINOPHIL # BLD: 0.3 TH/MM3 (ref 0–0.4)
EOSINOPHIL NFR BLD: 5.4 % (ref 0–4)
ERYTHROCYTE [DISTWIDTH] IN BLOOD BY AUTOMATED COUNT: 13.9 % (ref 11.6–17.2)
GFR SERPLBLD BASED ON 1.73 SQ M-ARVRAT: 121 ML/MIN (ref 89–?)
GLUCOSE SERPL-MCNC: 75 MG/DL (ref 74–106)
GLUCOSE UR STRIP-MCNC: (no result) MG/DL
HCO3 BLD-SCNC: 20.7 MEQ/L (ref 21–32)
HCT VFR BLD CALC: 33.8 % (ref 35–46)
HGB BLD-MCNC: 11.5 GM/DL (ref 11.6–15.3)
HGB UR QL STRIP: (no result)
INR PPP: 1.1 RATIO
KETONES UR STRIP-MCNC: (no result) MG/DL
LYMPHOCYTES # BLD AUTO: 1.7 TH/MM3 (ref 1–4.8)
LYMPHOCYTES NFR BLD AUTO: 30.5 % (ref 9–44)
MCH RBC QN AUTO: 35.4 PG (ref 27–34)
MCHC RBC AUTO-ENTMCNC: 34.2 % (ref 32–36)
MCV RBC AUTO: 103.6 FL (ref 80–100)
MONOCYTE #: 0.8 TH/MM3 (ref 0–0.9)
MONOCYTES NFR BLD: 13.9 % (ref 0–8)
MUCOUS THREADS #/AREA URNS LPF: (no result) /LPF
NEUTROPHILS # BLD AUTO: 2.7 TH/MM3 (ref 1.8–7.7)
NEUTROPHILS NFR BLD AUTO: 49.4 % (ref 16–70)
NITRITE UR QL STRIP: (no result)
PLATELET # BLD: 189 TH/MM3 (ref 150–450)
PMV BLD AUTO: 7.3 FL (ref 7–11)
PROTHROMBIN TIME: 11.5 SEC (ref 9.8–11.6)
RBC # BLD AUTO: 3.26 MIL/MM3 (ref 4–5.3)
RENAL EPI CELLS #/AREA URNS HPF: <1 /HPF
SODIUM SERPL-SCNC: 139 MEQ/L (ref 136–145)
SP GR UR STRIP: 1.01 (ref 1–1.03)
SQUAMOUS #/AREA URNS HPF: 4 /HPF (ref 0–5)
TRANS CELLS #/AREA URNS HPF: <1 /HPF
TROPONIN I SERPL-MCNC: (no result) NG/ML (ref 0.02–0.05)
URINE LEUKOCYTE ESTERASE: (no result)
WBC # BLD AUTO: 5.4 TH/MM3 (ref 4–11)

## 2018-03-19 PROCEDURE — 70450 CT HEAD/BRAIN W/O DYE: CPT

## 2018-03-19 PROCEDURE — 85610 PROTHROMBIN TIME: CPT

## 2018-03-19 PROCEDURE — 81001 URINALYSIS AUTO W/SCOPE: CPT

## 2018-03-19 PROCEDURE — 86403 PARTICLE AGGLUT ANTBDY SCRN: CPT

## 2018-03-19 PROCEDURE — 80048 BASIC METABOLIC PNL TOTAL CA: CPT

## 2018-03-19 PROCEDURE — 85025 COMPLETE CBC W/AUTO DIFF WBC: CPT

## 2018-03-19 PROCEDURE — 87186 SC STD MICRODIL/AGAR DIL: CPT

## 2018-03-19 PROCEDURE — 84484 ASSAY OF TROPONIN QUANT: CPT

## 2018-03-19 PROCEDURE — 93005 ELECTROCARDIOGRAM TRACING: CPT

## 2018-03-19 PROCEDURE — 87086 URINE CULTURE/COLONY COUNT: CPT

## 2018-03-19 PROCEDURE — 87077 CULTURE AEROBIC IDENTIFY: CPT

## 2018-03-19 PROCEDURE — 71045 X-RAY EXAM CHEST 1 VIEW: CPT

## 2018-03-19 PROCEDURE — 82550 ASSAY OF CK (CPK): CPT

## 2018-03-19 NOTE — RADRPT
EXAM DATE/TIME:  03/19/2018 03:30 

 

[facility] COMPARISON:     

No previous studies available for comparison.

 

 

INDICATIONS :     

Altered mental status; possible fall.

                      

 

RADIATION DOSE:     

56.35 CTDIvol (mGy) 

 

 

 

MEDICAL HISTORY :     

None  

 

SURGICAL HISTORY :      

Hysterectomy. Tonsillectomy.Left breast lumpectomy

 

ENCOUNTER:      

Initial

 

ACUITY:      

1 day

 

PAIN SCALE:      

Non-responsive

 

LOCATION:        

cranial 

 

TECHNIQUE:     

Multiple contiguous axial images were obtained of the head.  Using automated exposure control and adj
ustment of the mA and/or kV according to patient size, radiation dose was kept as low as reasonably a
chievable to obtain optimal diagnostic quality images.   DICOM format image data is available electro
nically for review and comparison.  

 

FINDINGS:     

 

CEREBRUM:     

The ventricles, sulci, and basal cisterns are prominent, characteristic of moderate severity central 
and cortical atrophy.  No evidence of midline shift, mass lesion, hemorrhage or acute infarction.  No
 extra-axial fluid collections are seen.

 

POSTERIOR FOSSA:     

The cerebellum and brainstem are intact.  The 4th ventricle is midline.  The cerebellopontine angle i
s unremarkable.

 

EXTRACRANIAL:     

The visualized portion of the orbits is intact.

 

SKULL:     

Focal scalp hematoma left parasagittal high convexity occipital region.  The calvaria is intact.  No 
evidence of skull fracture.

 

CONCLUSION:     

1. Moderate central and cortical atrophy.  No acute findings.

2. Left parasagittal occipital scalp hematoma without evidence of skull fracture.

 

 

 

 Francisco Allen MD on March 19, 2018 at 3:43           

Board Certified Radiologist.

 This report was verified electronically.

## 2018-03-19 NOTE — EKG
Date Performed: 03/19/2018       Time Performed: 03:27:09

 

PTAGE:      67 years

 

EKG:      Sinus rhythm 

 

 NORMAL ECG

 

PREVIOUS TRACING       : 08/28/2017 16.20 Since the previous tracing, no significant change noted

 

DOCTOR:   Alma Osorio  Interpretating Date/Time  03/19/2018 11:53:01

## 2018-03-19 NOTE — PD
HPI


Chief Complaint:  Fall


Time Seen by Provider:  03:16


Travel History


International Travel<30 days:  No


Contact w/Intl Traveler<30days:  No


Traveled to known affect area:  No





History of Present Illness


HPI


67-year-old female was brought to the emergency room by EMS for fall, head 

injury and some confusion status post head injury.  Patient was asleep when she 

tried to get out of her bed after she woke up and fell backwards and hit her 

head.  Her  noticed that she was acting a little confused after that.  

He called 911.  EMS noticed her pupils to be unequal and brought her in 

emergently.  After arriving patient has been GCS of 15.  She is not complaining 

of any pain.  Vital signs were stable.  Patient is not on any blood thinners.  

Patient was brought in boarded and collared





[facility]


Past Medical History


*** Narrative Medical


List of her past medical, surgical, social and family history reviewed from the 

nursing note.


Arthritis:  Yes


Blood Disorders:  No


Anxiety:  Yes


Depression:  Yes


Cancer:  Yes (LEFT BREAST LUMPECTOMY W/ LYMPH NODE REMOVED '07)


Cardiovascular Problems:  Yes (htn)


Diabetes:  No


Gastrointestinal Disorders:  No


Glaucoma:  No


Genitourinary:  Yes


Hepatitis:  No


Hiatal Hernia:  No


Hypertension:  No


Immune Disorder:  No


Musculoskeletal:  Yes


Neurologic:  Yes (RLS - CHRONIC INSOMNIA)


Reproductive:  No


Respiratory:  No


Thyroid Disease:  No





Past Surgical History


Abdominal Surgery:  No


Cardiac Surgery:  No


Ear Surgery:  No


Endocrine Surgery:  No


Eye Surgery:  No


Genitourinary Surgery:  No


Gynecologic Surgery:  Yes (HYSTERECTOMY X 10YRS AGO)


Hysterectomy:  Yes


Oral Surgery:  Yes (TONSILLECTOMY / AS A CHILD)


Pacemaker:  No


Thoracic Surgery:  No


Tonsillectomy:  Yes


Other Surgery:  Yes (RT HIP)





Social History


Alcohol Use:  Yes (WINE DAILY)


Tobacco Use:  No


Substance Use:  No





Allergies-Medications


(Allergen,Severity, Reaction):  


Uncoded Allergies:  


     raw egg (Allergy, Unknown, 8/22/17)


Comments


List of her allergies reviewed from the nursing


Reported Meds & Prescriptions





Reported Meds & Active Scripts


Active


Macrobid (Nitrofurantoin Monoh/Nitrofur Macro) 100 Mg Cap 100 Mg PO BID 7 Days


Reported


Atelvia (Risedronate) 35 Mg Tabdr 35 Mg PO Q7D


Diazepam 5 Mg Tab 5 Mg PO BID PRN


Omeprazole 40 Mg Cap 40 Mg PO BID


Atenolol 100 Mg Tab 100 Mg PO DAILY


Atenolol 50 Mg Tab 50 Mg PO HS





Narrative Medication


List of her home medications reviewed from the nursing note.





Review of Systems


Except as stated in HPI:  all other systems reviewed are Neg





Physical Exam


Narrative


GENERAL: Awake, alert, boarded and collared


SKIN: Focused skin assessment warm/dry.


HEAD: Atraumatic. Normocephalic. 


EYES: Pupils equal and round. No scleral icterus. No injection or drainage. 


ENT: No nasal bleeding or discharge.  Mucous membranes pink and moist.


NECK: Trachea midline. No JVD. 


CARDIOVASCULAR: Regular rate and rhythm.  No murmur appreciated.


RESPIRATORY: No accessory muscle use. Clear to auscultation. Breath sounds 

equal bilaterally. 


GASTROINTESTINAL: Abdomen soft, non-tender, nondistended. Hepatic and splenic 

margins not palpable. 


MUSCULOSKELETAL: No obvious deformities. No clubbing.  No cyanosis.  No edema.  

Rheumatoid arthritis


NEUROLOGICAL: Awake and alert. No obvious cranial nerve deficits.  Motor 

grossly within normal limits. Normal speech.


PSYCHIATRIC: Appropriate mood and affect; insight and judgment normal.





Data


Data


Last Documented VS





Orders





 Orders


Electrocardiogram (3/19/18 03:17)


Basic Metabolic Panel (Bmp) (3/19/18 03:17)


Complete Blood Count With Diff (3/19/18 03:17)


Creatine Kinase (Cpk) (3/19/18 03:17)


Prothrombin Time / Inr (Pt) (3/19/18 03:17)


Troponin I (3/19/18 03:17)


Urinalysis - C+S If Indicated (3/19/18 03:17)


Chest, Single Ap (3/19/18 03:17)


Ct Brain W/O Iv Contrast(Rout) (3/19/18 03:17)


Blood Glucose (3/19/18 03:17)


Ecg Monitoring (3/19/18 03:17)


Iv Access Insert/Monitor (3/19/18 03:17)


Oximetry (3/19/18 03:17)


Sodium Chloride 0.9% Flush (Ns Flush) (3/19/18 03:30)


Urine Culture (3/19/18 04:45)


Nitrofurantoin Monohyd Macrocr (Macrobid (3/19/18 05:45)


Ed Discharge Order (3/19/18 06:15)





Labs





Laboratory Tests








Test


  3/19/18


01:03 3/19/18


04:45 3/19/18


05:48


 


Prothrombin Time 11.5 SEC   


 


Prothromb Time International


Ratio 1.1 RATIO 


  


  


 


 


Blood Urea Nitrogen 10 MG/DL   


 


Creatinine 0.51 MG/DL   


 


Random Glucose 75 MG/DL   


 


Calcium Level 8.2 MG/DL   


 


Sodium Level 139 MEQ/L   


 


Potassium Level 4.1 MEQ/L   


 


Chloride Level 104 MEQ/L   


 


Carbon Dioxide Level 20.7 MEQ/L   


 


Anion Gap 14 MEQ/L   


 


Estimat Glomerular Filtration


Rate 121 ML/MIN 


  


  


 


 


Total Creatine Kinase 67 U/L   


 


Troponin I


  LESS THAN 0.02


NG/ML 


  


 


 


Urine Color  YELLOW  


 


Urine Turbidity  CLEAR  


 


Urine pH  6.0  


 


Urine Specific Gravity  1.009  


 


Urine Protein  NEG mg/dL  


 


Urine Glucose (UA)  NEG mg/dL  


 


Urine Ketones  NEG mg/dL  


 


Urine Occult Blood  NEG  


 


Urine Nitrite  POS  


 


Urine Bilirubin  NEG  


 


Urine Urobilinogen


  


  LESS THAN 2.0


MG/DL 


 


 


Urine Leukocyte Esterase  LARGE  


 


Urine RBC  1 /hpf  


 


Urine WBC  6 /hpf  


 


Urine Squamous Epithelial


Cells 


  4 /hpf 


  


 


 


Urine Transitional Epithelial


Cells 


  <1 /hpf 


  


 


 


Urine Renal Epithelial Cells  <1 /hpf  


 


Urine Bacteria  RARE /hpf  


 


Urine Mucus  FEW /lpf  


 


Microscopic Urinalysis Comment


  


  CATH-CULTURE


IND 


 


 


White Blood Count   5.4 TH/MM3 


 


Red Blood Count   3.26 MIL/MM3 


 


Hemoglobin   11.5 GM/DL 


 


Hematocrit   33.8 % 


 


Mean Corpuscular Volume   103.6 FL 


 


Mean Corpuscular Hemoglobin   35.4 PG 


 


Mean Corpuscular Hemoglobin


Concent 


  


  34.2 % 


 


 


Red Cell Distribution Width   13.9 % 


 


Platelet Count   189 TH/MM3 


 


Mean Platelet Volume   7.3 FL 


 


Neutrophils (%) (Auto)   49.4 % 


 


Lymphocytes (%) (Auto)   30.5 % 


 


Monocytes (%) (Auto)   13.9 % 


 


Eosinophils (%) (Auto)   5.4 % 


 


Basophils (%) (Auto)   0.8 % 


 


Neutrophils # (Auto)   2.7 TH/MM3 


 


Lymphocytes # (Auto)   1.7 TH/MM3 


 


Monocytes # (Auto)   0.8 TH/MM3 


 


Eosinophils # (Auto)   0.3 TH/MM3 


 


Basophils # (Auto)   0.0 TH/MM3 


 


CBC Comment   DIFF FINAL 


 


Differential Comment    











MDM


Medical Decision Making


Medical Screen Exam Complete:  Yes


Emergency Medical Condition:  Yes


Medical Record Reviewed:  Yes


Interpretation(s)


Twelve-lead EKG was reviewed by me.  Normal sinus rhythm, normal axis, 

nonspecific ST-T wave changes.  Heart rate of 68 bpm.


Differential Diagnosis


Intracranial bleed, concussion, skull fracture


Narrative Course


6:08 AM blood test results are back and within acceptable limits.  UA is 

suggestive of UTI.  CT scan of her head was negative for intracranial bleed or 

skull fracture. I have asked the nurse to ambulate the patient.  If she does 

okay she will be discharged home.  I have given her dose of Macrobid for the 

UTI.





6:15 AM patient ambulated well.  I discussed with her and her  test 

results.  I am comfortable discharging her home.





Procedures


EKG Prior to Arrival:  No





Diagnosis





 Primary Impression:  


 Head injury


 Qualified Codes:  S09.90XA - Unspecified injury of head, initial encounter


 Additional Impressions:  


 Concussion


 Qualified Codes:  S06.0X1A - Concussion with loss of consciousness of 30 

minutes or less, initial encounter


 UTI (urinary tract infection)


 Qualified Codes:  N39.0 - Urinary tract infection, site not specified





***Additional Instructions:  


Return to the ER if condition worsens or any other new concerns.  Apply ice 

pack on the back of the head will you have the swelling to minimize the 

swelling.  Take Tylenol for headache.  Headache, dizziness, nausea could be all 

part of the postconcussive syndrome.  Follow-up with your primary care if 

symptoms worsen.


***Med/Other Pt SpecificInfo:  Prescription(s) given


Scripts


Nitrofurantoin Monohydrate Macrocrystals (Macrobid) 100 Mg Cap


100 MG PO BID for Infection for 7 Days, #14 CAP 0 Refills


   Prov: Lalitha Escobar MD         3/19/18


Disposition:  01 DISCHARGE HOME


Condition:  Stable











Lalitha Escobar MD Mar 19, 2018 03:17

## 2018-03-19 NOTE — RADRPT
EXAM DATE/TIME:  03/19/2018 03:38 

 

[facility] COMPARISON:     

CHEST SINGLE AP, August 28, 2017, 16:07.

 

                     

INDICATIONS :     

Short of breath.

                     

 

MEDICAL HISTORY :            

Hypertension. Carcinoma, breast.   

 

SURGICAL HISTORY :        

Lumpectomy, left.

 

ENCOUNTER:     

Subsequent                                        

 

ACUITY:     

1 week      

 

PAIN SCORE:     

0/10

 

LOCATION:     

Bilateral  chest

 

FINDINGS:     

A single view of the chest demonstrates the lungs to be symmetrically aerated without evidence of mas
s, infiltrate or effusion.  The cardiomediastinal contours are unremarkable.  Chronic deformity of th
e left shoulder.

 

CONCLUSION:     The lungs are clear.

 

 

 

 Francisco Allen MD on March 19, 2018 at 3:48           

Board Certified Radiologist.

 This report was verified electronically.

## 2018-04-11 ENCOUNTER — HOSPITAL ENCOUNTER (EMERGENCY)
Dept: HOSPITAL 17 - NEPD | Age: 68
LOS: 1 days | Discharge: HOME | End: 2018-04-12
Payer: MEDICARE

## 2018-04-11 VITALS — HEIGHT: 64 IN | WEIGHT: 125.66 LBS | BODY MASS INDEX: 21.45 KG/M2

## 2018-04-11 VITALS
RESPIRATION RATE: 15 BRPM | HEART RATE: 77 BPM | OXYGEN SATURATION: 95 % | SYSTOLIC BLOOD PRESSURE: 117 MMHG | DIASTOLIC BLOOD PRESSURE: 69 MMHG | TEMPERATURE: 97.9 F
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
## 2018-04-11 DIAGNOSIS — F32.9: ICD-10-CM

## 2018-04-11 DIAGNOSIS — G25.81: ICD-10-CM

## 2018-04-11 DIAGNOSIS — Z79.899: ICD-10-CM

## 2018-04-11 DIAGNOSIS — I10: ICD-10-CM

## 2018-04-11 DIAGNOSIS — S80.212A: ICD-10-CM

## 2018-04-11 DIAGNOSIS — S00.83XA: ICD-10-CM

## 2018-04-11 DIAGNOSIS — S89.91XA: Primary | ICD-10-CM

## 2018-04-11 DIAGNOSIS — F41.9: ICD-10-CM

## 2018-04-11 PROCEDURE — 73564 X-RAY EXAM KNEE 4 OR MORE: CPT

## 2018-04-11 PROCEDURE — 99283 EMERGENCY DEPT VISIT LOW MDM: CPT

## 2018-04-11 NOTE — RADRPT
EXAM DATE/TIME:  04/11/2018 21:45 

 

[facility] COMPARISON:     

No previous studies available for comparison.

 

                     

INDICATIONS :     

Right knee pain post fall today

                     

 

MEDICAL HISTORY :     

None.          

 

SURGICAL HISTORY :     

None.   

 

ENCOUNTER:     

Initial                                        

 

ACUITY:     

1 day      

 

PAIN SCORE:     

7/10

 

LOCATION:     

Right proximal tibia

 

FINDINGS:     

Four view examination of the right knee demonstrates no evidence of fracture or dislocation.  Bony mi
neralization is normal.  The articular surfaces are intact.  The suprapatellar soft tissues have a no
rmal configuration.

 

CONCLUSION:     

Normal examination for a patient of this age.  

 

 

 

 Marquez Bonilla MD on April 11, 2018 at 22:08           

Board Certified Radiologist.

 This report was verified electronically.

## 2018-04-11 NOTE — PD
HPI


Chief Complaint:  Fall


Time Seen by Provider:  21:15


Travel History


International Travel<30 days:  No


Contact w/Intl Traveler<30days:  No


Traveled to known affect area:  No





History of Present Illness


HPI


This patient gets periodic attacks of vertigo.  She got one today and had a 

room spinning dizziness.  This caused her to lose her balance and she fell and 

landed on her knees.  She lightly grazed her right cheek but does not have any 

head or facial pain or neck pain.  No LOC.  Her chief complaint is right knee 

pain.  She cannot put weight on it.  She landed on the left knee also but does 

not have pain there.  She was supposed to be using a walker but was not at the 

time.  No alleviating factors.  Duration is 2 hours.  Severity is moderate.  Is 

worse with weightbearing.





PFSH


Past Medical History


Arthritis:  Yes


Blood Disorders:  No


Anxiety:  Yes


Depression:  Yes


Cancer:  Yes (LEFT BREAST LUMPECTOMY W/ LYMPH NODE REMOVED '07)


Cardiovascular Problems:  Yes (htn)


Diabetes:  No


Gastrointestinal Disorders:  No


Glaucoma:  No


Genitourinary:  Yes


Hepatitis:  No


Hiatal Hernia:  No


Hypertension:  Yes


Immune Disorder:  No


Musculoskeletal:  Yes


Neurologic:  Yes (RLS - CHRONIC INSOMNIA)


Reproductive:  No


Respiratory:  No


Thyroid Disease:  No


Influenza Vaccination:  No


Pregnant?:  Not Pregnant


Menopausal:  Yes





Past Surgical History


Abdominal Surgery:  No


Cardiac Surgery:  No


Ear Surgery:  No


Endocrine Surgery:  No


Eye Surgery:  Yes (rt eye cataract sx)


Genitourinary Surgery:  No


Gynecologic Surgery:  Yes (HYSTERECTOMY X 10YRS AGO, LEFT LUMPECTOMY WIHT 

LYMPHNODE REMOVAL)


Hysterectomy:  Yes


Oral Surgery:  Yes (TONSILLECTOMY / AS A CHILD)


Pacemaker:  No


Thoracic Surgery:  No


Tonsillectomy:  Yes


Other Surgery:  Yes (RIGHT TOTAL HIP)





Social History


Alcohol Use:  Yes (WINE DAILY)


Tobacco Use:  No


Substance Use:  No





Allergies-Medications


(Allergen,Severity, Reaction):  


Uncoded Allergies:  


     raw egg (Allergy, Unknown, 8/22/17)


Reported Meds & Prescriptions





Reported Meds & Active Scripts


Active


Reported


Clonidine (Clonidine HCl) 0.1 Mg Tab 0.1 Mg PO DAILY


Zolpidem (Zolpidem Tartrate) 10 Mg Tab 10 Mg PO HS PRN


Citalopram (Citalopram Hydrobromide) 20 Mg Tab 20 Mg PO DAILY


Atelvia (Risedronate) 35 Mg Tabdr 35 Mg PO Q7D


Diazepam 5 Mg Tab 5 Mg PO BID PRN


Atenolol 100 Mg Tab 100 Mg PO DAILY


Atenolol 50 Mg Tab 50 Mg PO HS








Review of Systems


General / Constitutional:  No: Fever


Eyes:  No: Visual changes


HENT:  No: Headaches


Cardiovascular:  No: Chest Pain or Discomfort


Respiratory:  No: Shortness of Breath


Gastrointestinal:  No: Abdominal Pain


Genitourinary:  No: Dysuria


Musculoskeletal:  Positive: Arthralgias, Limited ROM, Pain


Skin:  No Rash


Neurologic:  No: Weakness


Psychiatric:  No: Depression


Endocrine:  No: Polydipsia


Hematologic/Lymphatic:  No: Easy Bruising





Physical Exam


Narrative


GENERAL: Well-nourished, well-developed patient in no apparent distress.


SKIN: Focused skin assessment reveals no rash and nodules. Skin is Warm and dry.


HEAD: Slight ecchymosis to the mid right cheek without tenderness.  

Normocephalic. 


EYES: Pupils equal and round. No scleral icterus. No injection or drainage. 


ENT: No nasal bleeding or discharge.  Mucous membranes pink and moist.


NECK: Trachea midline. No JVD.  No midline tenderness


CARDIOVASCULAR: Regular rate and rhythm.  No murmur appreciated.


RESPIRATORY: No accessory muscle use. Clear to auscultation. Breath sounds 

equal bilaterally. 


GASTROINTESTINAL: Abdomen soft, non-tender, nondistended. Hepatic and splenic 

margins not palpable. 


MUSCULOSKELETAL: There is skin tear and abrasion on the right patella with 

tenderness.  There is a bit of knee effusion there.  There is abrasion to the 

left knee but no tenderness.  No clubbing.  No cyanosis.  No edema. 


NEUROLOGICAL: Awake and alert. No obvious cranial nerve deficits.  Motor 

grossly within normal limits. Normal speech.


PSYCHIATRIC: Appropriate mood and affect; insight and judgment normal.





Data


Data


Last Documented VS





Vital Signs








  Date Time  Temp Pulse Resp B/P (MAP) Pulse Ox O2 Delivery O2 Flow Rate FiO2


 


4/11/18 20:47 97.9 77 15 117/69 (85) 95   








Orders





 Orders


Knee, Complete (4vws) (4/11/18 )


Ed Discharge Order (4/12/18 00:04)








MDM


Medical Decision Making


Medical Screen Exam Complete:  Yes


Emergency Medical Condition:  Yes


Medical Record Reviewed:  Yes


Differential Diagnosis


Knee fracture, knee dislocation, contusion


Narrative Course


I have reviewed the patient's electronic medical record.  She had a head injury 

last month with negative CT


I reviewed her right knee x-rays.  There is no evidence of fracture or 

dislocation





Supportive care discussed.  I am recommending wheelchair use.   will get 

one tomorrow.  She cannot use the walker given the injury.





I wrote her a few pain pills


She has an orthopedist she will call tomorrow for follow-up





Diagnosis





 Primary Impression:  


 Soft tissue injury of right knee


 Qualified Codes:  S89.91XA - Unspecified injury of right lower leg, initial 

encounter





***Additional Instructions:  


The patient was warned about potential sedation for the medications they will 

receive on prescription.


Ice and elevate right knee


Follow-up with primary care or orthopedist


***Med/Other Pt SpecificInfo:  Prescription(s) given


Disposition:  01 DISCHARGE HOME


Condition:  Stable











Wayne Gant MD Apr 11, 2018 21:37